# Patient Record
Sex: MALE | Race: BLACK OR AFRICAN AMERICAN | NOT HISPANIC OR LATINO | Employment: UNEMPLOYED | URBAN - METROPOLITAN AREA
[De-identification: names, ages, dates, MRNs, and addresses within clinical notes are randomized per-mention and may not be internally consistent; named-entity substitution may affect disease eponyms.]

---
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
## 2019-02-17 ENCOUNTER — OFFICE VISIT (OUTPATIENT)
Dept: URGENT CARE | Facility: CLINIC | Age: 19
End: 2019-02-17
Payer: MEDICAID

## 2019-02-17 VITALS
TEMPERATURE: 100 F | HEIGHT: 67 IN | RESPIRATION RATE: 18 BRPM | DIASTOLIC BLOOD PRESSURE: 79 MMHG | HEART RATE: 98 BPM | WEIGHT: 205 LBS | BODY MASS INDEX: 32.18 KG/M2 | SYSTOLIC BLOOD PRESSURE: 142 MMHG | OXYGEN SATURATION: 96 %

## 2019-02-17 DIAGNOSIS — R68.89 FLU-LIKE SYMPTOMS: Primary | ICD-10-CM

## 2019-02-17 DIAGNOSIS — J06.9 URI, ACUTE: ICD-10-CM

## 2019-02-17 LAB
CTP QC/QA: YES
FLUAV AG NPH QL: NEGATIVE
FLUBV AG NPH QL: NEGATIVE

## 2019-02-17 PROCEDURE — 87804 POCT INFLUENZA A/B: ICD-10-PCS | Mod: QW,S$GLB,, | Performed by: FAMILY MEDICINE

## 2019-02-17 PROCEDURE — 99213 OFFICE O/P EST LOW 20 MIN: CPT | Mod: S$GLB,,, | Performed by: FAMILY MEDICINE

## 2019-02-17 PROCEDURE — 87804 INFLUENZA ASSAY W/OPTIC: CPT | Mod: QW,S$GLB,, | Performed by: FAMILY MEDICINE

## 2019-02-17 PROCEDURE — 99213 PR OFFICE/OUTPT VISIT, EST, LEVL III, 20-29 MIN: ICD-10-PCS | Mod: S$GLB,,, | Performed by: FAMILY MEDICINE

## 2019-02-17 RX ORDER — DIVALPROEX SODIUM 250 MG/1
TABLET, DELAYED RELEASE ORAL
Refills: 0 | COMMUNITY
Start: 2019-01-22 | End: 2020-10-08

## 2019-02-17 RX ORDER — DIVALPROEX SODIUM 500 MG/1
TABLET, DELAYED RELEASE ORAL
Refills: 0 | COMMUNITY
Start: 2019-01-22 | End: 2020-10-08

## 2019-02-17 RX ORDER — LORATADINE 10 MG/1
10 TABLET ORAL DAILY
Qty: 30 TABLET | Refills: 2 | Status: SHIPPED | OUTPATIENT
Start: 2019-02-17 | End: 2020-02-17

## 2019-02-17 RX ORDER — OSELTAMIVIR PHOSPHATE 75 MG/1
75 CAPSULE ORAL 2 TIMES DAILY
Qty: 10 CAPSULE | Refills: 0 | Status: SHIPPED | OUTPATIENT
Start: 2019-02-17 | End: 2019-02-22

## 2019-02-17 RX ORDER — PALIPERIDONE 3 MG/1
3 TABLET, EXTENDED RELEASE ORAL NIGHTLY
Refills: 0 | COMMUNITY
Start: 2019-01-24 | End: 2020-10-08

## 2019-02-17 RX ORDER — DEXTROAMPHETAMINE SACCHARATE, AMPHETAMINE ASPARTATE, DEXTROAMPHETAMINE SULFATE AND AMPHETAMINE SULFATE 5; 5; 5; 5 MG/1; MG/1; MG/1; MG/1
1 TABLET ORAL EVERY MORNING
Refills: 0 | COMMUNITY
Start: 2019-01-22 | End: 2020-10-08

## 2019-02-17 RX ORDER — OLANZAPINE 10 MG/1
TABLET ORAL
Refills: 0 | COMMUNITY
Start: 2018-12-10 | End: 2020-10-08

## 2019-02-17 NOTE — PROGRESS NOTES
"Subjective:       Patient ID: Vadim Moyer is a 18 y.o. male.    Vitals:  height is 5' 7" (1.702 m) and weight is 93 kg (205 lb). His temperature is 99.5 °F (37.5 °C). His blood pressure is 142/79 (abnormal) and his pulse is 98. His respiration is 18 and oxygen saturation is 96%.     Chief Complaint: Cough    C/o cough and congestion      Cough   This is a new problem. The current episode started yesterday. The problem has been unchanged. The problem occurs constantly. The cough is non-productive. Associated symptoms include a fever. Pertinent negatives include no chills, ear pain, eye redness, hemoptysis, myalgias, rash, sore throat, shortness of breath or wheezing. Nothing aggravates the symptoms. He has tried nothing for the symptoms. His past medical history is significant for asthma. There is no history of bronchitis or pneumonia.       Constitution: Positive for fatigue and fever. Negative for chills and sweating.   HENT: Negative for ear pain, congestion, sinus pain, sinus pressure, sore throat and voice change.    Neck: Negative for painful lymph nodes.   Eyes: Negative for eye redness.   Respiratory: Positive for cough. Negative for chest tightness, sputum production, bloody sputum, COPD, shortness of breath, stridor, wheezing and asthma.    Gastrointestinal: Positive for diarrhea. Negative for nausea and vomiting.   Musculoskeletal: Negative for muscle ache.   Skin: Negative for rash.   Allergic/Immunologic: Negative for seasonal allergies and asthma.   Hematologic/Lymphatic: Negative for swollen lymph nodes.       Objective:      Physical Exam   Constitutional: He is oriented to person, place, and time. He appears well-developed and well-nourished. He is cooperative.  Non-toxic appearance. He does not appear ill.   HENT:   Head: Normocephalic and atraumatic.   Right Ear: Hearing, tympanic membrane, external ear and ear canal normal.   Left Ear: Hearing, tympanic membrane, external ear and ear canal " normal.   Nose: Nose normal. No mucosal edema, rhinorrhea or nasal deformity. No epistaxis. Right sinus exhibits no maxillary sinus tenderness and no frontal sinus tenderness. Left sinus exhibits no maxillary sinus tenderness and no frontal sinus tenderness.   Mouth/Throat: Uvula is midline, oropharynx is clear and moist and mucous membranes are normal. No trismus in the jaw. Normal dentition. No uvula swelling. No oropharyngeal exudate or posterior oropharyngeal erythema.   Eyes: Conjunctivae and lids are normal. Right eye exhibits no discharge. Left eye exhibits no discharge.   Sclera clear bilat   Neck: Trachea normal, normal range of motion, full passive range of motion without pain and phonation normal. Neck supple. No JVD present. No tracheal deviation present.   Cardiovascular: Normal rate, regular rhythm, normal heart sounds, intact distal pulses and normal pulses. Exam reveals no gallop and no friction rub.   No murmur heard.  Pulmonary/Chest: Effort normal and breath sounds normal. No stridor. He has no wheezes. He has no rales.   Abdominal: Soft. Normal appearance and bowel sounds are normal. He exhibits no distension, no pulsatile midline mass and no mass. There is no tenderness.   Musculoskeletal: Normal range of motion. He exhibits no edema or deformity.   Lymphadenopathy:     He has no cervical adenopathy.   Neurological: He is alert and oriented to person, place, and time. He exhibits normal muscle tone. Coordination normal.   Skin: Skin is warm, dry and intact. He is not diaphoretic. No pallor.   Psychiatric: He has a normal mood and affect. His speech is normal and behavior is normal. Judgment and thought content normal. Cognition and memory are normal.   Nursing note and vitals reviewed.      Assessment:       1. Flu-like symptoms    2. URI, acute        Plan:         Flu-like symptoms  -     POCT Influenza A/B  -     loratadine (CLARITIN) 10 mg tablet; Take 1 tablet (10 mg total) by mouth once  daily.  Dispense: 30 tablet; Refill: 2    URI, acute  -     oseltamivir (TAMIFLU) 75 MG capsule; Take 1 capsule (75 mg total) by mouth 2 (two) times daily. for 5 days  Dispense: 10 capsule; Refill: 0        Tylenol alternate with motrin 2 po q 6 hours prn

## 2019-02-17 NOTE — LETTER
February 17, 2019      [facility] [place] [address]  Phone: [phone]  Fax: [phone]       Patient: Vadim Moyer   YOB: 2000  Date of Visit: 02/17/2019    To Whom It May Concern:    Frankie Moyer  was at [facility] on 02/17/2019. He may return to work/school on 2/20/19 with no restrictions. If you have any questions or concerns, or if I can be of further assistance, please do not hesitate to contact me.    Sincerely,    Delphine Torres MD

## 2019-03-27 ENCOUNTER — HOSPITAL ENCOUNTER (EMERGENCY)
Facility: HOSPITAL | Age: 19
Discharge: PSYCHIATRIC OR SUBSTANCE USE FACILITY | End: 2019-03-28
Attending: EMERGENCY MEDICINE
Payer: MEDICAID

## 2019-03-27 DIAGNOSIS — R45.1 AGITATION: ICD-10-CM

## 2019-03-27 DIAGNOSIS — R46.89 AGGRESSIVE BEHAVIOR: ICD-10-CM

## 2019-03-27 DIAGNOSIS — R45.6 VIOLENT BEHAVIOR: ICD-10-CM

## 2019-03-27 DIAGNOSIS — R45.850 HOMICIDAL IDEATIONS: Primary | ICD-10-CM

## 2019-03-27 DIAGNOSIS — Z00.8 MEDICAL CLEARANCE FOR PSYCHIATRIC ADMISSION: ICD-10-CM

## 2019-03-27 PROCEDURE — 99284 EMERGENCY DEPT VISIT MOD MDM: CPT

## 2019-03-28 VITALS
RESPIRATION RATE: 17 BRPM | WEIGHT: 201 LBS | HEART RATE: 77 BPM | SYSTOLIC BLOOD PRESSURE: 128 MMHG | BODY MASS INDEX: 30.46 KG/M2 | OXYGEN SATURATION: 97 % | HEIGHT: 68 IN | DIASTOLIC BLOOD PRESSURE: 80 MMHG | TEMPERATURE: 98 F

## 2019-03-28 LAB
ALBUMIN SERPL BCP-MCNC: 3.7 G/DL (ref 3.5–5.2)
ALP SERPL-CCNC: 148 U/L (ref 55–135)
ALT SERPL W/O P-5'-P-CCNC: 8 U/L (ref 10–44)
AMPHET+METHAMPHET UR QL: NORMAL
ANION GAP SERPL CALC-SCNC: 8 MMOL/L (ref 8–16)
APAP SERPL-MCNC: <3 UG/ML (ref 10–20)
AST SERPL-CCNC: 15 U/L (ref 10–40)
BARBITURATES UR QL SCN>200 NG/ML: NEGATIVE
BASOPHILS # BLD AUTO: 0.02 K/UL (ref 0–0.2)
BASOPHILS NFR BLD: 0.2 % (ref 0–1.9)
BENZODIAZ UR QL SCN>200 NG/ML: NEGATIVE
BILIRUB SERPL-MCNC: 0.3 MG/DL (ref 0.1–1)
BILIRUB UR QL STRIP: NEGATIVE
BUN SERPL-MCNC: 8 MG/DL (ref 6–20)
BZE UR QL SCN: NEGATIVE
CALCIUM SERPL-MCNC: 10.2 MG/DL (ref 8.7–10.5)
CANNABINOIDS UR QL SCN: NORMAL
CHLORIDE SERPL-SCNC: 104 MMOL/L (ref 95–110)
CLARITY UR: CLEAR
CO2 SERPL-SCNC: 30 MMOL/L (ref 23–29)
COLOR UR: YELLOW
CREAT SERPL-MCNC: 0.8 MG/DL (ref 0.5–1.4)
CREAT UR-MCNC: 248.8 MG/DL (ref 23–375)
DIFFERENTIAL METHOD: NORMAL
EOSINOPHIL # BLD AUTO: 0.2 K/UL (ref 0–0.5)
EOSINOPHIL NFR BLD: 1.5 % (ref 0–8)
ERYTHROCYTE [DISTWIDTH] IN BLOOD BY AUTOMATED COUNT: 13.9 % (ref 11.5–14.5)
EST. GFR  (AFRICAN AMERICAN): >60 ML/MIN/1.73 M^2
EST. GFR  (NON AFRICAN AMERICAN): >60 ML/MIN/1.73 M^2
ETHANOL SERPL-MCNC: <10 MG/DL
GLUCOSE SERPL-MCNC: 89 MG/DL (ref 70–110)
GLUCOSE UR QL STRIP: NEGATIVE
HCT VFR BLD AUTO: 45.9 % (ref 40–54)
HGB BLD-MCNC: 14.9 G/DL (ref 14–18)
HGB UR QL STRIP: NEGATIVE
KETONES UR QL STRIP: NEGATIVE
LEUKOCYTE ESTERASE UR QL STRIP: NEGATIVE
LYMPHOCYTES # BLD AUTO: 3.4 K/UL (ref 1–4.8)
LYMPHOCYTES NFR BLD: 30.9 % (ref 18–48)
MCH RBC QN AUTO: 29.4 PG (ref 27–31)
MCHC RBC AUTO-ENTMCNC: 32.5 G/DL (ref 32–36)
MCV RBC AUTO: 91 FL (ref 82–98)
METHADONE UR QL SCN>300 NG/ML: NEGATIVE
MONOCYTES # BLD AUTO: 0.8 K/UL (ref 0.3–1)
MONOCYTES NFR BLD: 7.6 % (ref 4–15)
NEUTROPHILS # BLD AUTO: 6.5 K/UL (ref 1.8–7.7)
NEUTROPHILS NFR BLD: 59.5 % (ref 38–73)
NITRITE UR QL STRIP: NEGATIVE
OPIATES UR QL SCN: NEGATIVE
PCP UR QL SCN>25 NG/ML: NEGATIVE
PH UR STRIP: 7 [PH] (ref 5–8)
PLATELET # BLD AUTO: 244 K/UL (ref 150–350)
PMV BLD AUTO: 9.3 FL (ref 9.2–12.9)
POTASSIUM SERPL-SCNC: 4 MMOL/L (ref 3.5–5.1)
PROT SERPL-MCNC: 6.9 G/DL (ref 6–8.4)
PROT UR QL STRIP: ABNORMAL
RBC # BLD AUTO: 5.07 M/UL (ref 4.6–6.2)
SODIUM SERPL-SCNC: 142 MMOL/L (ref 136–145)
SP GR UR STRIP: 1.02 (ref 1–1.03)
TOXICOLOGY INFORMATION: NORMAL
TSH SERPL DL<=0.005 MIU/L-ACNC: 3.75 UIU/ML (ref 0.4–4)
URN SPEC COLLECT METH UR: ABNORMAL
UROBILINOGEN UR STRIP-ACNC: NEGATIVE EU/DL
VALPROATE SERPL-MCNC: 61.2 UG/ML (ref 50–100)
WBC # BLD AUTO: 10.99 K/UL (ref 3.9–12.7)

## 2019-03-28 PROCEDURE — 80320 DRUG SCREEN QUANTALCOHOLS: CPT

## 2019-03-28 PROCEDURE — 80329 ANALGESICS NON-OPIOID 1 OR 2: CPT

## 2019-03-28 PROCEDURE — 93010 ELECTROCARDIOGRAM REPORT: CPT | Mod: ,,, | Performed by: STUDENT IN AN ORGANIZED HEALTH CARE EDUCATION/TRAINING PROGRAM

## 2019-03-28 PROCEDURE — 81003 URINALYSIS AUTO W/O SCOPE: CPT | Mod: 59

## 2019-03-28 PROCEDURE — 80053 COMPREHEN METABOLIC PANEL: CPT

## 2019-03-28 PROCEDURE — 80164 ASSAY DIPROPYLACETIC ACD TOT: CPT

## 2019-03-28 PROCEDURE — 85025 COMPLETE CBC W/AUTO DIFF WBC: CPT

## 2019-03-28 PROCEDURE — 93005 ELECTROCARDIOGRAM TRACING: CPT

## 2019-03-28 PROCEDURE — 93010 EKG 12-LEAD: ICD-10-PCS | Mod: ,,, | Performed by: STUDENT IN AN ORGANIZED HEALTH CARE EDUCATION/TRAINING PROGRAM

## 2019-03-28 PROCEDURE — 84443 ASSAY THYROID STIM HORMONE: CPT

## 2019-03-28 PROCEDURE — 80307 DRUG TEST PRSMV CHEM ANLYZR: CPT
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
## 2019-03-28 NOTE — ED NOTES
Patient accepted by [place] at [facility] ([address] [place] [address]) for the service of Dr. Ayala.  Report to be called to [phone], ext. [phone].

## 2019-03-28 NOTE — ED NOTES
CON received in [facility].  Admit packet faxed to [facility], [facility], [facility], and [facility].

## 2019-03-28 NOTE — ED NOTES
Pt has been medically cleared. Transfer line called.Spoke to Giulia. Faxed face sheet and PEC to [facility].

## 2019-03-28 NOTE — ED NOTES
Pt is PEC. Placed in paper scrubs. All items that can be used to harm patient or staff removed from room. Bety Romero PCT at bedside for One on one observation. Awaiting lab draw, urine specimen, and CT. Will monitor pt closely.
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
## 2019-03-28 NOTE — ED NOTES
Admit packet faxed to [facility], [facility], [facility], [facility], [facility], [facility], [facility], [place],  Waiting for response.

## 2019-05-29 ENCOUNTER — HOSPITAL ENCOUNTER (EMERGENCY)
Facility: HOSPITAL | Age: 19
Discharge: HOME | End: 2019-05-29
Attending: EMERGENCY MEDICINE
Payer: MEDICAID

## 2019-05-29 VITALS
OXYGEN SATURATION: 100 % | BODY MASS INDEX: 30.53 KG/M2 | WEIGHT: 190 LBS | HEART RATE: 82 BPM | SYSTOLIC BLOOD PRESSURE: 140 MMHG | TEMPERATURE: 99 F | DIASTOLIC BLOOD PRESSURE: 75 MMHG | RESPIRATION RATE: 18 BRPM | HEIGHT: 66 IN

## 2019-05-29 DIAGNOSIS — T67.5XXA HEAT EXHAUSTION, INITIAL ENCOUNTER: Primary | ICD-10-CM

## 2019-05-29 PROCEDURE — 99283 EMERGENCY DEPT VISIT LOW MDM: CPT | Mod: ER

## 2019-05-29 PROCEDURE — 25000003 PHARM REV CODE 250: Mod: ER | Performed by: EMERGENCY MEDICINE

## 2019-05-29 RX ORDER — IBUPROFEN 400 MG/1
400 TABLET ORAL
Status: COMPLETED | OUTPATIENT
Start: 2019-05-29 | End: 2019-05-29

## 2019-05-29 RX ORDER — ACETAMINOPHEN 325 MG/1
650 TABLET ORAL
Status: COMPLETED | OUTPATIENT
Start: 2019-05-29 | End: 2019-05-29

## 2019-05-29 RX ADMIN — IBUPROFEN 400 MG: 400 TABLET, FILM COATED ORAL at 09:05

## 2019-05-29 RX ADMIN — ACETAMINOPHEN 650 MG: 325 TABLET, FILM COATED ORAL at 09:05

## 2019-05-30 NOTE — ED PROVIDER NOTES
Encounter Date: 5/29/2019    SCRIBE #1 NOTE: I, Luis Oquendo, am scribing for, and in the presence of,  Dr. Cole. I have scribed the following portions of the note - Other sections scribed: HPI, ROS, PE.       History     Chief Complaint   Patient presents with    Headache     PT C/O GENERALIZED HEADACHE FOR 15 MIN     CC:   Generalized HA  HPI:  This is a 18 y.o. male who presents to the ED with a chief complaint of an acute nontraumatic generalized HA that began fifteen minutes ago.  Pt endorses lightheadedness.  Pt states that he works at [employer] and was in a hot environment.  Pt admits he has not drank much water today.  He has not taken pain medication.  Nothing worsens his symptoms.  He denies fever, sore throat, visual disturbances, LOC, nausea, emesis, or diarrhea.     The history is provided by the patient.     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     Asthma, not well controlled     Asthma, well controlled     Atopy     Bipolar 1 disorder     Hypertension     Overweight(278.02)     Psychiatric symptoms      Past Surgical History:   Procedure Laterality Date    PENIS SURGERY       Family History   Problem Relation Age of Onset    Asthma Mother     Asthma Sister     Hypertension Maternal Grandmother     Coronary artery disease Maternal Grandmother     Heart attack Maternal Grandfather      Social History     Tobacco Use    Smoking status: Current Every Day Smoker     Packs/day: 0.50     Types: Cigarettes    Smokeless tobacco: Never Used   Substance Use Topics    Alcohol use: No    Drug use: No     Review of Systems   Constitutional: Negative.  Negative for chills and fever.   HENT: Negative.  Negative for sore throat.    Eyes: Negative.  Negative for pain and visual disturbance.   Respiratory: Negative.  Negative for shortness of breath.     Cardiovascular: Negative.  Negative for chest pain.   Gastrointestinal: Negative.  Negative for abdominal pain, diarrhea, nausea and vomiting.   Genitourinary: Negative.  Negative for dysuria.   Musculoskeletal: Negative.  Negative for back pain.   Skin: Negative.  Negative for rash.   Neurological: Positive for light-headedness and headaches. Negative for syncope.   Hematological: Negative.    Psychiatric/Behavioral: Negative.    All other systems reviewed and are negative.      Physical Exam     Initial Vitals [05/29/19 2055]   BP Pulse Resp Temp SpO2   (!) 145/81 80 20 98.8 °F (37.1 °C) 100 %      MAP       --         Physical Exam    Nursing note and vitals reviewed.  Constitutional: He appears well-developed and well-nourished.   HENT:   Head: Normocephalic and atraumatic.   Mouth/Throat: Uvula is midline. Mucous membranes are dry.   Dry, pasty mucous membranes.    Eyes: Conjunctivae and EOM are normal. Pupils are equal, round, and reactive to light.   Neck: Normal range of motion. Neck supple.   Cardiovascular: Regular rhythm and intact distal pulses. Tachycardia present.  Exam reveals no gallop and no friction rub.    No murmur heard.  Pt became tachycardic when he sat up.     Pulmonary/Chest: Effort normal and breath sounds normal. No respiratory distress. He has no wheezes. He has no rhonchi. He has no rales. He exhibits no tenderness.   Abdominal: Soft. There is no tenderness.   Musculoskeletal: Normal range of motion.   Neurological: He is alert and oriented to person, place, and time.   Skin: Skin is warm and dry. Capillary refill takes less than 2 seconds.        Psychiatric: He has a normal mood and affect. His behavior is normal.         ED Course   Procedures  Labs Reviewed - No data to display       Imaging Results    None                     Scribe Attestation:   Scribe #1: I performed the above scribed service and the documentation accurately describes the services I performed. I attest to the  accuracy of the note.    This document was produced by a scribe under my direction and in my presence. I agree with the content of the note and have made any necessary edits.     Driss Cole MD    05/29/2019 9:17 PM           Clinical Impression:     1. Heat exhaustion, initial encounter                                  Driss Cole MD  05/29/19 0005

## 2019-05-30 NOTE — DISCHARGE INSTRUCTIONS
Tylenol Motrin and plenty of fluids.  Drink a balance electrolyte solution such as Gatorade.  No work tomorrow and avoid any excessive heat.

## 2019-10-02 ENCOUNTER — OFFICE VISIT (OUTPATIENT)
Dept: URGENT CARE | Facility: CLINIC | Age: 19
End: 2019-10-02
Payer: MEDICAID

## 2019-10-02 VITALS
SYSTOLIC BLOOD PRESSURE: 103 MMHG | OXYGEN SATURATION: 97 % | TEMPERATURE: 98 F | BODY MASS INDEX: 30.53 KG/M2 | HEIGHT: 66 IN | RESPIRATION RATE: 18 BRPM | WEIGHT: 190 LBS | DIASTOLIC BLOOD PRESSURE: 76 MMHG | HEART RATE: 69 BPM

## 2019-10-02 DIAGNOSIS — J45.901 MODERATE ASTHMA WITH EXACERBATION, UNSPECIFIED WHETHER PERSISTENT: Primary | ICD-10-CM

## 2019-10-02 PROCEDURE — 94640 PR INHAL RX, AIRWAY OBST/DX SPUTUM INDUCT: ICD-10-PCS | Mod: S$GLB,,, | Performed by: PHYSICIAN ASSISTANT

## 2019-10-02 PROCEDURE — 99214 OFFICE O/P EST MOD 30 MIN: CPT | Mod: 25,S$GLB,, | Performed by: PHYSICIAN ASSISTANT

## 2019-10-02 PROCEDURE — 94640 AIRWAY INHALATION TREATMENT: CPT | Mod: S$GLB,,, | Performed by: PHYSICIAN ASSISTANT

## 2019-10-02 PROCEDURE — 99214 PR OFFICE/OUTPT VISIT, EST, LEVL IV, 30-39 MIN: ICD-10-PCS | Mod: 25,S$GLB,, | Performed by: PHYSICIAN ASSISTANT

## 2019-10-02 RX ORDER — BUDESONIDE AND FORMOTEROL FUMARATE DIHYDRATE 80; 4.5 UG/1; UG/1
2 AEROSOL RESPIRATORY (INHALATION) 2 TIMES DAILY
Refills: 1 | COMMUNITY
Start: 2019-08-20 | End: 2020-10-08 | Stop reason: SDUPTHER

## 2019-10-02 RX ORDER — PREDNISONE 20 MG/1
TABLET ORAL
Qty: 9 TABLET | Refills: 0 | Status: SHIPPED | OUTPATIENT
Start: 2019-10-02 | End: 2020-10-08

## 2019-10-02 RX ORDER — ALBUTEROL SULFATE 90 UG/1
2 AEROSOL, METERED RESPIRATORY (INHALATION) EVERY 6 HOURS PRN
Qty: 18 G | Refills: 0 | Status: SHIPPED | OUTPATIENT
Start: 2019-10-02 | End: 2019-11-01

## 2019-10-02 RX ORDER — MIRTAZAPINE 30 MG/1
30 TABLET, FILM COATED ORAL NIGHTLY
Refills: 0 | COMMUNITY
Start: 2019-07-25 | End: 2020-10-08

## 2019-10-02 RX ORDER — ALBUTEROL SULFATE 0.83 MG/ML
2.5 SOLUTION RESPIRATORY (INHALATION)
Status: COMPLETED | OUTPATIENT
Start: 2019-10-02 | End: 2019-10-02

## 2019-10-02 RX ORDER — ALBUTEROL SULFATE 1.25 MG/3ML
2.5 SOLUTION RESPIRATORY (INHALATION) EVERY 6 HOURS PRN
Qty: 1 BOX | Refills: 0 | Status: SHIPPED | OUTPATIENT
Start: 2019-10-02 | End: 2019-11-01

## 2019-10-02 RX ADMIN — ALBUTEROL SULFATE 2.5 MG: 0.83 SOLUTION RESPIRATORY (INHALATION) at 01:10

## 2019-10-02 NOTE — PATIENT INSTRUCTIONS
PLEASE READ YOUR DISCHARGE INSTRUCTIONS ENTIRELY AS IT CONTAINS IMPORTANT INFORMATION.  Please start the prednisone tomorrow.  Please follow up with your PCP.  - Rest.    - Drink plenty of fluids.    - Tylenol or Ibuprofen as directed as needed for fever/pain.    - Follow up with your PCP or specialty clinic as directed in the next 1-2 weeks if not improved or as needed.  You can call [phone] to schedule an appointment with the appropriate provider.    - If you were prescribed antibiotics, please take them to completion.  - If you were prescribed a narcotic medication, do not drive or operate heavy equipment or machinery while taking these medications.  - If you  smoke, please stop smoking.  -You must understand that you've received an Urgent Care treatment only and that you may be released before all your medical problems are known or treated. You, the patient, will    arrange for follow up care as instructed.  - Please return to Urgent Care or to the Emergency Department if your symptoms worsen.  You received a steroid today - this can elevate your blood pressure, elevate your blood sugar, water weight gain, nervous energy, redness to the face and dimpling of the skin where the shot goes in if you had an injection.   Do not use steroids more than 3 times per year.   If you have diabetes, please check you blood sugar frequently.  If you have high blood pressure, please check your blood pressure frequently.     Patient aware and verbalized understanding.    Asthma Trigger Checklist  Allergens, irritants, and other things may trigger your asthma. Check the box next to each of your triggers. After each trigger is a list of ways to avoid it.     Dust mites. Dust mites live in mattresses, bedding, carpets, curtains, and indoor dust.  · To kill dust mites, wash bedding in hot water (130°F) each week.  · Cover mattress and pillows with special dust-mite-proof cases.  · Dont use upholstered furniture like sofas or  chairs in the bedroom.  · Use allergy-proof filters for air conditioners and furnaces. Replace or clean them as instructed.  · If you can, replace carpeting with wood or tile misti, especially in the bedroom.    Animals. Animals with fur or feathers shed dander (allergens).  · Its best to choose a pet that doesnt have fur or feathers, such as a fish or a reptile.  · If you have pets, keep them off of your bed and out of your bedroom.  · Wash your hands and clothes after handling pets.      Mold. Mold grows in damp places, such as bathrooms, basements, and closets.  · Ask someone to clean damp areas in your home every week. Or try wearing a face mask while you clean.  · Run an exhaust fan while bathing. Or leave a window open in the bathroom.  · Repair water leaks in or around your home.  · Have someone else cut grass or rake leaves, if possible.  · Dont use vaporizers or humidifiers. They encourage mold growth.      Pollen. Pollen from trees, grasses, and weeds is a common allergen. (Flower pollens are generally not a problem).  · Try to learn what types of pollen affect you most. Pollen levels vary depending on the plant, the season, and the time of day.  · If possible, use air conditioning instead of opening the windows in your home or car.  · Have someone else do yard work, if possible.      Cockroaches. Roaches are found in many homes and produce allergens.  · Keep your kitchen clean and dry. A leaky faucet or drain can attract roaches.  · Remove garbage from your home daily.  · Store food in tightly sealed containers. Wash dishes as soon as they are used.  · Use bait stations or traps to control roaches. Avoid using chemical sprays.      Smoke. Smoke may be from cigarettes, cigars, pipes, incense or candles, barbecues or grills, and fireplaces.  · Dont smoke. And dont let people smoke in your home or car.  · When you travel, ask for nonsmoking rental cars and hotel rooms.  · Avoid fireplaces and wood  stoves. If you cant, sit away from them. Make sure the smoke is directed outside.  · Dont burn incense or use candles.  · Move away from smoky outdoor cooking grills.      Smog.  Smog is from car exhaust and other pollution.  · Read or listen to local air-quality reports. These let you know when air quality is poor.  · Stay indoors as much as you can on smoggy days. If possible, use air conditioning instead of opening the windows.  · In your car, set air conditioning to recirculate air, so less pollution gets in.      Strong odors. These include air fresheners, deodorizers, and cleaning products; perfume, deodorant, and other beauty products; incense and candles; and insect sprays and other sprays.  · Use scent-free products like deodorant or body lotion.  · Avoid using cleaning products with bleach and ammonia. Make your own cleaning solution with white vinegar, baking soda, or mild dish soap.  · Use exhaust fans while cooking. Or open a window, if possible.   · Avoid perfumes, air fresheners, potpourri, and other scented products.      Other irritants. These include dust, aerosol sprays, and powders.  · Wear a face mask while doing tasks like sanding, dusting, sweeping, and yard work. Open doors and windows if working indoors.  · Use pump spray bottles instead of aerosols.  · Pour liquid  onto a rag or cloth instead of spraying them.      Weather. Weather conditions can trigger symptoms or make them worse.  · Watch for very high or low temperatures, very humid conditions, or a lot of wind, as these conditions can make symptoms worse.  · Limit outdoor activity during the type of weather that affects you.  · Wear a scarf over your mouth and nose in cold weather.      Colds, flu, and sinus infections. Upper respiratory infections can trigger asthma.  · Wash your hands often with soap and warm water or use a hand  containing alcohol.  · Get a yearly flu shot. And ask if you should get a pneumonia  vaccine.  · Take care of your general health. Get plenty of sleep. And eat a variety of healthy foods.      Food additives. Food additives can trigger asthma flare-ups in some people.  · Check food labels for sulfites or other similar ingredients. These are often found in foods such as wine, beer, and dried fruits.  · Avoid foods that contain these additives.      Medicine. Aspirin, NSAIDS like ibuprofen and naproxen, and heart medicines like beta-blockers may be triggers.  · Tell your health care provider if you think certain medicines trigger symptoms.   · Be sure to read the labels on over-the-counter medicines. They may have ingredients that cause symptoms for you.     , Emotions. Laughing, crying, or feeling excited are triggers for some people.   · To help you stay calm: Try breathing in slowly through your nose for a count of 2 seconds. Close your lips and breathe out for 4 seconds. Repeat.  · Try to focus on a soothing image in your mind. This will help relax you and calm your breathing.  · Remember to take your daily controller medicines. When youre upset or under stress, its easy to forget.      Exercise. For some people, exercise can trigger symptoms. Dont let this keep you from being active.   · If you have not been exercising regularly, start slow and work up gradually.  · Take all of your medicines as prescribed.  · If you use quick-relief medicine, make sure you have it with you when you exercise.  · Stop if you have any symptoms. Make sure you talk with your provider about these symptoms.  Date Last Reviewed: 1/1/2017  © 6289-6662 [employer]. [address] [place] [address], [place] [address]. All rights reserved. This information is not intended as a substitute for professional medical care. Always follow your healthcare professional's instructions.        Asthma (Adult)  Asthma is a disease where the medium and  small air passages within the lung go into spasm and restrict the flow of air.  "Inflammation and swelling of the airways cause further restriction. During an acute asthma attack, these factors cause difficulty breathing, wheezing, cough and chest tightness.    An asthma attack can be triggered by many things. Common triggers include infections such as the common cold, bronchitis, pneumonia. Irritants such as smoke or pollutants in the air, emotional upset, and exercise can also trigger an attack. In many adults with asthma, allergies to dust, mold, pollen and animal dander can cause an asthma attack. Skipping doses of daily asthma medicine can also bring on an asthma attack.  Asthma can be controlled using the proper medicines prescribed by your healthcare provider and avoiding exposure to known triggers including allergens and irritants.  Home care  · Take prescribed medicine exactly at the times advised. If you need medicine such as from a hand held inhaler or aerosol breathing machine more than every 4 hours, contact your healthcare provider or seek immediate medical attention. If prescribed an antibiotic or prednisone, take all of the medicine as prescribed, even if you are feeling better after a few days.  · Do not smoke. Avoid being exposed to the smoke of others.  · Some people with asthma have worsening of their symptoms when they take aspirin and non-steroidal or fever-reducing medicines like ibuprofen and naproxen. Talk to your healthcare provider if you think this may apply to you.  Follow-up care  Follow up with your healthcare provider, or as advised. Always bring all of your current medicines to any appointments with your healthcare provider. Also bring a complete list of medications even those not taken for asthma. If you do not already have one, talk to your healthcare provider about developing a personalized "Asthma Action Plan."  A pneumococcal (pneumonia) vaccine and yearly flu shot (every fall) are recommended. Ask your doctor about this.  When to seek medical advice  Call " your healthcare provider right away if any of these occur:   · Increased wheezing or shortness of breath  · Need to use your inhalers more often than usual without relief  · Fever of 100.4ºF (38ºC) or higher, or as directed by your healthcare provider  · Coughing up lots of dark-colored or bloody sputum (mucus)  · Chest pain with each breath  · If you use a peak flow meter as part of an Asthma Action Plan, and you are still in the yellow zone (50% to 80%) 15 minutes after using inhaler medicine.  Call 911  Call 911 if any of the following occur  · Trouble walking or talking because of shortness of breath  · If you use a peak flow meter as part of an Asthma Action Plan and you are still in the red zone (less than 50%) 15 minutes after using inhaler medicine  · Lips or fingernails turning gray or blue  Date Last Reviewed: 12/2/2015  © 9092-3087 [email or web address]. [address] [place] [address], [place] [address]. All rights reserved. This information is not intended as a substitute for professional medical care. Always follow your healthcare professional's instructions.

## 2019-10-02 NOTE — PROGRESS NOTES
"Subjective:       Patient ID: Vadim Moyer is a 18 y.o. male.    Vitals:  height is 5' 6" (1.676 m) and weight is 86.2 kg (190 lb). His temperature is 97.9 °F (36.6 °C). His blood pressure is 103/76 and his pulse is 69. His respiration is 18 and oxygen saturation is 97%.     Chief Complaint: Asthma    Vadim presents with his mother for asthma exacerbation.  He was cutting the gras 3 days ago & started having coughing & wheezing the next day.  It has worsened since then & last night, he was up all night doing nebulizer treatments with little relief.  He denies any congestion, fevers, rhinorrhea, sore throat.  He currently take symbicort on a daily basis.    Asthma   He complains of cough, sputum production and wheezing. There is no hemoptysis or shortness of breath. This is a new problem. The current episode started yesterday. The problem has been gradually worsening. The cough is productive of sputum. Pertinent negatives include no ear pain, fever, myalgias or sore throat. His symptoms are aggravated by exposure to smoke, pollen and any activity. His symptoms are alleviated by ipratropium (albuterol). He reports moderate improvement on treatment. His past medical history is significant for asthma.       Constitution: Negative for chills, sweating, fatigue and fever.   HENT: Negative for ear pain, congestion, sinus pain, sinus pressure, sore throat and voice change.    Neck: Negative for painful lymph nodes.   Eyes: Negative for eye redness.   Respiratory: Positive for cough, sputum production, wheezing and asthma. Negative for chest tightness, bloody sputum, COPD, shortness of breath and stridor.    Gastrointestinal: Negative for nausea and vomiting.   Musculoskeletal: Negative for muscle ache.   Skin: Negative for rash.   Allergic/Immunologic: Positive for asthma. Negative for seasonal allergies.   Hematologic/Lymphatic: Negative for swollen lymph nodes.       Objective:      Physical Exam   Constitutional: He " is oriented to person, place, and time. He appears well-developed and well-nourished. He is cooperative.  Non-toxic appearance. He does not appear ill. No distress.   HENT:   Head: Normocephalic and atraumatic.   Right Ear: Hearing, tympanic membrane, external ear and ear canal normal.   Left Ear: Hearing, tympanic membrane, external ear and ear canal normal.   Nose: Nose normal. No mucosal edema, rhinorrhea or nasal deformity. No epistaxis. Right sinus exhibits no maxillary sinus tenderness and no frontal sinus tenderness. Left sinus exhibits no maxillary sinus tenderness and no frontal sinus tenderness.   Mouth/Throat: Uvula is midline, oropharynx is clear and moist and mucous membranes are normal. No trismus in the jaw. Normal dentition. No uvula swelling. No posterior oropharyngeal erythema.   Eyes: Conjunctivae and lids are normal. No scleral icterus.   Sclera clear bilat   Neck: Trachea normal, full passive range of motion without pain and phonation normal. Neck supple.   Cardiovascular: Normal rate, regular rhythm, normal heart sounds, intact distal pulses and normal pulses.   Pulmonary/Chest: Effort normal. No stridor. No respiratory distress. He has no decreased breath sounds. He has wheezes in the right upper field, the right lower field, the left upper field and the left lower field. He has no rhonchi. He has no rales.   Insp & exp wheezes throughout.   Abdominal: Soft. Normal appearance and bowel sounds are normal. He exhibits no distension. There is no tenderness.   Musculoskeletal: Normal range of motion. He exhibits no edema or deformity.   Lymphadenopathy:     He has no cervical adenopathy.   Neurological: He is alert and oriented to person, place, and time. He exhibits normal muscle tone. Coordination normal.   Skin: Skin is warm, dry and intact. He is not diaphoretic. No pallor.   Psychiatric: He has a normal mood and affect. His speech is normal and behavior is normal. Judgment and thought content  normal. Cognition and memory are normal.   Nursing note and vitals reviewed.      O2 sat 100% on repeat exam, pulse 68, improved aeration throughout lung fields, minimal wheeze    Assessment:       1. Moderate asthma with exacerbation, unspecified whether persistent        Plan:         Moderate asthma with exacerbation, unspecified whether persistent    Other orders  -     albuterol nebulizer solution 2.5 mg  -     methylPREDNISolone sod suc(PF) injection 125 mg  -     albuterol (PROVENTIL HFA) 90 mcg/actuation inhaler; Inhale 2 puffs into the lungs every 6 (six) hours as needed for Wheezing. Rescue  Dispense: 18 g; Refill: 0  -     albuterol (ACCUNEB) 1.25 mg/3 mL Nebu; Take 6 mLs (2.5 mg total) by nebulization every 6 (six) hours as needed (wheezing). Rescue  Dispense: 1 Box; Refill: 0  -     predniSONE (DELTASONE) 20 MG tablet; Take 2 tabs by mouth once daily x 3 days, then 1 tab by mouth once daily x 3 days.  Dispense: 9 tablet; Refill: 0    Discussed risks of steroids with patient, he verbalized understanding.     Patient Instructions   PLEASE READ YOUR DISCHARGE INSTRUCTIONS ENTIRELY AS IT CONTAINS IMPORTANT INFORMATION.  Please start the prednisone tomorrow.  Please follow up with your PCP.  - Rest.    - Drink plenty of fluids.    - Tylenol or Ibuprofen as directed as needed for fever/pain.    - Follow up with your PCP or specialty clinic as directed in the next 1-2 weeks if not improved or as needed.  You can call [phone] to schedule an appointment with the appropriate provider.    - If you were prescribed antibiotics, please take them to completion.  - If you were prescribed a narcotic medication, do not drive or operate heavy equipment or machinery while taking these medications.  - If you  smoke, please stop smoking.  -You must understand that you've received an Urgent Care treatment only and that you may be released before all your medical problems are known or treated. You, the patient, will     arrange for follow up care as instructed.  - Please return to Urgent Care or to the Emergency Department if your symptoms worsen.  You received a steroid today - this can elevate your blood pressure, elevate your blood sugar, water weight gain, nervous energy, redness to the face and dimpling of the skin where the shot goes in if you had an injection.   Do not use steroids more than 3 times per year.   If you have diabetes, please check you blood sugar frequently.  If you have high blood pressure, please check your blood pressure frequently.     Patient aware and verbalized understanding.    Asthma Trigger Checklist  Allergens, irritants, and other things may trigger your asthma. Check the box next to each of your triggers. After each trigger is a list of ways to avoid it.     Dust mites. Dust mites live in mattresses, bedding, carpets, curtains, and indoor dust.  · To kill dust mites, wash bedding in hot water (130°F) each week.  · Cover mattress and pillows with special dust-mite-proof cases.  · Dont use upholstered furniture like sofas or chairs in the bedroom.  · Use allergy-proof filters for air conditioners and furnaces. Replace or clean them as instructed.  · If you can, replace carpeting with wood or tile misti, especially in the bedroom.    Animals. Animals with fur or feathers shed dander (allergens).  · Its best to choose a pet that doesnt have fur or feathers, such as a fish or a reptile.  · If you have pets, keep them off of your bed and out of your bedroom.  · Wash your hands and clothes after handling pets.      Mold. Mold grows in damp places, such as bathrooms, basements, and closets.  · Ask someone to clean damp areas in your home every week. Or try wearing a face mask while you clean.  · Run an exhaust fan while bathing. Or leave a window open in the bathroom.  · Repair water leaks in or around your home.  · Have someone else cut grass or rake leaves, if possible.  · Dont use  vaporizers or humidifiers. They encourage mold growth.      Pollen. Pollen from trees, grasses, and weeds is a common allergen. (Flower pollens are generally not a problem).  · Try to learn what types of pollen affect you most. Pollen levels vary depending on the plant, the season, and the time of day.  · If possible, use air conditioning instead of opening the windows in your home or car.  · Have someone else do yard work, if possible.      Cockroaches. Roaches are found in many homes and produce allergens.  · Keep your kitchen clean and dry. A leaky faucet or drain can attract roaches.  · Remove garbage from your home daily.  · Store food in tightly sealed containers. Wash dishes as soon as they are used.  · Use bait stations or traps to control roaches. Avoid using chemical sprays.      Smoke. Smoke may be from cigarettes, cigars, pipes, incense or candles, barbecues or grills, and fireplaces.  · Dont smoke. And dont let people smoke in your home or car.  · When you travel, ask for nonsmoking rental cars and hotel rooms.  · Avoid fireplaces and wood stoves. If you cant, sit away from them. Make sure the smoke is directed outside.  · Dont burn incense or use candles.  · Move away from smoky outdoor cooking grills.      Smog.  Smog is from car exhaust and other pollution.  · Read or listen to local air-quality reports. These let you know when air quality is poor.  · Stay indoors as much as you can on smoggy days. If possible, use air conditioning instead of opening the windows.  · In your car, set air conditioning to recirculate air, so less pollution gets in.      Strong odors. These include air fresheners, deodorizers, and cleaning products; perfume, deodorant, and other beauty products; incense and candles; and insect sprays and other sprays.  · Use scent-free products like deodorant or body lotion.  · Avoid using cleaning products with bleach and ammonia. Make your own cleaning solution with white vinegar,  baking soda, or mild dish soap.  · Use exhaust fans while cooking. Or open a window, if possible.   · Avoid perfumes, air fresheners, potpourri, and other scented products.      Other irritants. These include dust, aerosol sprays, and powders.  · Wear a face mask while doing tasks like sanding, dusting, sweeping, and yard work. Open doors and windows if working indoors.  · Use pump spray bottles instead of aerosols.  · Pour liquid  onto a rag or cloth instead of spraying them.      Weather. Weather conditions can trigger symptoms or make them worse.  · Watch for very high or low temperatures, very humid conditions, or a lot of wind, as these conditions can make symptoms worse.  · Limit outdoor activity during the type of weather that affects you.  · Wear a scarf over your mouth and nose in cold weather.      Colds, flu, and sinus infections. Upper respiratory infections can trigger asthma.  · Wash your hands often with soap and warm water or use a hand  containing alcohol.  · Get a yearly flu shot. And ask if you should get a pneumonia vaccine.  · Take care of your general health. Get plenty of sleep. And eat a variety of healthy foods.      Food additives. Food additives can trigger asthma flare-ups in some people.  · Check food labels for sulfites or other similar ingredients. These are often found in foods such as wine, beer, and dried fruits.  · Avoid foods that contain these additives.      Medicine. Aspirin, NSAIDS like ibuprofen and naproxen, and heart medicines like beta-blockers may be triggers.  · Tell your health care provider if you think certain medicines trigger symptoms.   · Be sure to read the labels on over-the-counter medicines. They may have ingredients that cause symptoms for you.     , Emotions. Laughing, crying, or feeling excited are triggers for some people.   · To help you stay calm: Try breathing in slowly through your nose for a count of 2 seconds. Close your lips and  breathe out for 4 seconds. Repeat.  · Try to focus on a soothing image in your mind. This will help relax you and calm your breathing.  · Remember to take your daily controller medicines. When youre upset or under stress, its easy to forget.      Exercise. For some people, exercise can trigger symptoms. Dont let this keep you from being active.   · If you have not been exercising regularly, start slow and work up gradually.  · Take all of your medicines as prescribed.  · If you use quick-relief medicine, make sure you have it with you when you exercise.  · Stop if you have any symptoms. Make sure you talk with your provider about these symptoms.  Date Last Reviewed: 1/1/2017 © 2000-2017 [employer]. [address] [place] [address], [place] [address]. All rights reserved. This information is not intended as a substitute for professional medical care. Always follow your healthcare professional's instructions.        Asthma (Adult)  Asthma is a disease where the medium and  small air passages within the lung go into spasm and restrict the flow of air. Inflammation and swelling of the airways cause further restriction. During an acute asthma attack, these factors cause difficulty breathing, wheezing, cough and chest tightness.    An asthma attack can be triggered by many things. Common triggers include infections such as the common cold, bronchitis, pneumonia. Irritants such as smoke or pollutants in the air, emotional upset, and exercise can also trigger an attack. In many adults with asthma, allergies to dust, mold, pollen and animal dander can cause an asthma attack. Skipping doses of daily asthma medicine can also bring on an asthma attack.  Asthma can be controlled using the proper medicines prescribed by your healthcare provider and avoiding exposure to known triggers including allergens and irritants.  Home care  · Take prescribed medicine exactly at the times advised. If you need medicine such as from a  "hand held inhaler or aerosol breathing machine more than every 4 hours, contact your healthcare provider or seek immediate medical attention. If prescribed an antibiotic or prednisone, take all of the medicine as prescribed, even if you are feeling better after a few days.  · Do not smoke. Avoid being exposed to the smoke of others.  · Some people with asthma have worsening of their symptoms when they take aspirin and non-steroidal or fever-reducing medicines like ibuprofen and naproxen. Talk to your healthcare provider if you think this may apply to you.  Follow-up care  Follow up with your healthcare provider, or as advised. Always bring all of your current medicines to any appointments with your healthcare provider. Also bring a complete list of medications even those not taken for asthma. If you do not already have one, talk to your healthcare provider about developing a personalized "Asthma Action Plan."  A pneumococcal (pneumonia) vaccine and yearly flu shot (every fall) are recommended. Ask your doctor about this.  When to seek medical advice  Call your healthcare provider right away if any of these occur:   · Increased wheezing or shortness of breath  · Need to use your inhalers more often than usual without relief  · Fever of 100.4ºF (38ºC) or higher, or as directed by your healthcare provider  · Coughing up lots of dark-colored or bloody sputum (mucus)  · Chest pain with each breath  · If you use a peak flow meter as part of an Asthma Action Plan, and you are still in the yellow zone (50% to 80%) 15 minutes after using inhaler medicine.  Call 911  Call 911 if any of the following occur  · Trouble walking or talking because of shortness of breath  · If you use a peak flow meter as part of an Asthma Action Plan and you are still in the red zone (less than 50%) 15 minutes after using inhaler medicine  · Lips or fingernails turning gray or blue  Date Last Reviewed: 12/2/2015  © 8421-8040 [employer]. [address] [place] [id number]. All rights reserved. This information is not intended as a substitute for professional medical care. Always follow your healthcare professional's instructions.

## 2019-10-23 RX ORDER — ALBUTEROL SULFATE 90 UG/1
2 AEROSOL, METERED RESPIRATORY (INHALATION) EVERY 6 HOURS PRN
Qty: 18 INHALER | Refills: 0 | OUTPATIENT
Start: 2019-10-23 | End: 2019-11-22

## 2020-02-28 ENCOUNTER — HOSPITAL ENCOUNTER (EMERGENCY)
Facility: HOSPITAL | Age: 20
Discharge: HOME | End: 2020-02-29
Attending: EMERGENCY MEDICINE
Payer: MEDICAID

## 2020-02-28 DIAGNOSIS — J45.41 MODERATE PERSISTENT ASTHMA WITH EXACERBATION: Primary | ICD-10-CM

## 2020-02-28 PROCEDURE — 99284 EMERGENCY DEPT VISIT MOD MDM: CPT | Mod: 25

## 2020-02-28 PROCEDURE — 25000242 PHARM REV CODE 250 ALT 637 W/ HCPCS: Performed by: EMERGENCY MEDICINE

## 2020-02-28 PROCEDURE — 94640 AIRWAY INHALATION TREATMENT: CPT

## 2020-02-28 RX ORDER — PREDNISONE 20 MG/1
40 TABLET ORAL
Status: COMPLETED | OUTPATIENT
Start: 2020-02-28 | End: 2020-02-29

## 2020-02-28 RX ORDER — IPRATROPIUM BROMIDE AND ALBUTEROL SULFATE 2.5; .5 MG/3ML; MG/3ML
3 SOLUTION RESPIRATORY (INHALATION)
Status: COMPLETED | OUTPATIENT
Start: 2020-02-28 | End: 2020-02-28

## 2020-02-28 RX ADMIN — IPRATROPIUM BROMIDE AND ALBUTEROL SULFATE 3 ML: .5; 3 SOLUTION RESPIRATORY (INHALATION) at 11:02

## 2020-02-29 ENCOUNTER — OFFICE VISIT (OUTPATIENT)
Dept: URGENT CARE | Facility: CLINIC | Age: 20
End: 2020-02-29
Payer: MEDICAID

## 2020-02-29 VITALS
RESPIRATION RATE: 18 BRPM | HEIGHT: 66 IN | HEART RATE: 80 BPM | TEMPERATURE: 98 F | WEIGHT: 185.75 LBS | SYSTOLIC BLOOD PRESSURE: 127 MMHG | DIASTOLIC BLOOD PRESSURE: 89 MMHG | BODY MASS INDEX: 29.85 KG/M2 | OXYGEN SATURATION: 100 %

## 2020-02-29 VITALS
TEMPERATURE: 98 F | WEIGHT: 185 LBS | HEART RATE: 81 BPM | BODY MASS INDEX: 29.73 KG/M2 | HEIGHT: 66 IN | OXYGEN SATURATION: 96 % | DIASTOLIC BLOOD PRESSURE: 90 MMHG | RESPIRATION RATE: 18 BRPM | SYSTOLIC BLOOD PRESSURE: 150 MMHG

## 2020-02-29 DIAGNOSIS — J45.21 MILD INTERMITTENT ASTHMA WITH EXACERBATION: ICD-10-CM

## 2020-02-29 DIAGNOSIS — H65.01 NON-RECURRENT ACUTE SEROUS OTITIS MEDIA OF RIGHT EAR: ICD-10-CM

## 2020-02-29 DIAGNOSIS — J06.9 UPPER RESPIRATORY VIRUS: Primary | ICD-10-CM

## 2020-02-29 LAB
INFLUENZA A, MOLECULAR: NEGATIVE
INFLUENZA B, MOLECULAR: NEGATIVE
SPECIMEN SOURCE: NORMAL

## 2020-02-29 PROCEDURE — 94640 PR INHAL RX, AIRWAY OBST/DX SPUTUM INDUCT: ICD-10-PCS | Mod: S$GLB,,, | Performed by: PHYSICIAN ASSISTANT

## 2020-02-29 PROCEDURE — 63600175 PHARM REV CODE 636 W HCPCS: Performed by: EMERGENCY MEDICINE

## 2020-02-29 PROCEDURE — 87502 INFLUENZA DNA AMP PROBE: CPT

## 2020-02-29 PROCEDURE — 99214 PR OFFICE/OUTPT VISIT, EST, LEVL IV, 30-39 MIN: ICD-10-PCS | Mod: 25,S$GLB,, | Performed by: PHYSICIAN ASSISTANT

## 2020-02-29 PROCEDURE — 94640 AIRWAY INHALATION TREATMENT: CPT | Mod: S$GLB,,, | Performed by: PHYSICIAN ASSISTANT

## 2020-02-29 PROCEDURE — 99214 OFFICE O/P EST MOD 30 MIN: CPT | Mod: 25,S$GLB,, | Performed by: PHYSICIAN ASSISTANT

## 2020-02-29 RX ORDER — DEXAMETHASONE SODIUM PHOSPHATE 100 MG/10ML
10 INJECTION INTRAMUSCULAR; INTRAVENOUS
Status: DISCONTINUED | OUTPATIENT
Start: 2020-02-29 | End: 2020-02-29

## 2020-02-29 RX ORDER — PREDNISONE 20 MG/1
TABLET ORAL
Qty: 9 TABLET | Refills: 0 | Status: SHIPPED | OUTPATIENT
Start: 2020-02-29 | End: 2020-10-08

## 2020-02-29 RX ORDER — AMOXICILLIN 875 MG/1
875 TABLET, FILM COATED ORAL 2 TIMES DAILY
Qty: 14 TABLET | Refills: 0 | Status: SHIPPED | OUTPATIENT
Start: 2020-02-29 | End: 2020-03-07

## 2020-02-29 RX ORDER — PREDNISONE 50 MG/1
50 TABLET ORAL DAILY
Qty: 3 TABLET | Refills: 0 | Status: SHIPPED | OUTPATIENT
Start: 2020-02-29 | End: 2020-03-03

## 2020-02-29 RX ORDER — IPRATROPIUM BROMIDE 0.5 MG/2.5ML
0.5 SOLUTION RESPIRATORY (INHALATION)
Status: COMPLETED | OUTPATIENT
Start: 2020-02-29 | End: 2020-02-29

## 2020-02-29 RX ORDER — ALBUTEROL SULFATE 0.83 MG/ML
2.5 SOLUTION RESPIRATORY (INHALATION)
Status: COMPLETED | OUTPATIENT
Start: 2020-02-29 | End: 2020-02-29

## 2020-02-29 RX ORDER — ALBUTEROL SULFATE 1.25 MG/3ML
2.5 SOLUTION RESPIRATORY (INHALATION) EVERY 6 HOURS PRN
Qty: 1 BOX | Refills: 0 | Status: SHIPPED | OUTPATIENT
Start: 2020-02-29 | End: 2020-03-30

## 2020-02-29 RX ADMIN — PREDNISONE 40 MG: 20 TABLET ORAL at 12:02

## 2020-02-29 RX ADMIN — ALBUTEROL SULFATE 2.5 MG: 0.83 SOLUTION RESPIRATORY (INHALATION) at 06:02

## 2020-02-29 RX ADMIN — IPRATROPIUM BROMIDE 0.5 MG: 0.5 SOLUTION RESPIRATORY (INHALATION) at 06:02

## 2020-02-29 NOTE — ED NOTES
Pt presents to the ED secondary to SOB and cough x3 days. Pt states he has a hx of asthma and has been having worsening sob over the last 3 days. Reports giving himself breathing treatments at home and has only had temporary relief. Pt states 4 hours after his treatments he becomes SOB again. Pt's cough is non productive and is worse at night. Pt denies fever, chills, chest pain. Pt reports some relief post breathing tx but is concerned sob will return in 4 hours.

## 2020-02-29 NOTE — ED PROVIDER NOTES
"Encounter Date: 2/28/2020    SCRIBE #1 NOTE: I, Annie Patel, am scribing for, and in the presence of,  Dr. Moreno. I have scribed the entire note.       History     Chief Complaint   Patient presents with    Asthma     19y M ambulatory to ED with c/o SOB and cough worse at night x3 days. h/o asthma, no relief from treatments at home     20 y/o M presents to the ER c/o cough, SOB, CP. Onset 4 or 5 days ago. Has h/o asthma, states this feels similar to an exacerbation. Notes he has been using his home treatments "all day", and only gets temporary incomplete relief. Notes cough is nonproductive, and elicits some aching CP. Denies any fever. No exertional component reported. No other symptoms reported at this time.     The history is provided by the patient.     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     Asthma, not well controlled     Asthma, well controlled     Atopy     Bipolar 1 disorder     Hypertension     Overweight(278.02)     Psychiatric symptoms      Past Surgical History:   Procedure Laterality Date    PENIS SURGERY       Family History   Problem Relation Age of Onset    Asthma Mother     Asthma Sister     Hypertension Maternal Grandmother     Coronary artery disease Maternal Grandmother     Heart attack Maternal Grandfather      Social History     Tobacco Use    Smoking status: Current Every Day Smoker     Packs/day: 0.50     Types: Cigarettes    Smokeless tobacco: Never Used   Substance Use Topics    Alcohol use: No    Drug use: No     Review of Systems   Constitutional: Negative for chills, fatigue and fever.   HENT: Negative for congestion, sore throat and voice change.    Eyes: Negative for photophobia, pain and redness.   Respiratory: Positive for cough and shortness of breath. Negative for choking.  "   Cardiovascular: Positive for chest pain. Negative for palpitations and leg swelling.   Gastrointestinal: Negative for abdominal pain, diarrhea, nausea and vomiting.   Genitourinary: Negative for dysuria, frequency and urgency.   Musculoskeletal: Negative for back pain, neck pain and neck stiffness.   Neurological: Negative for light-headedness, numbness and headaches.   All other systems reviewed and are negative.      Physical Exam     Initial Vitals [02/28/20 2309]   BP Pulse Resp Temp SpO2   139/82 87 16 98.8 °F (37.1 °C) 98 %      MAP       --         Physical Exam    Nursing note and vitals reviewed.  Constitutional: He appears well-developed and well-nourished. No distress.   HENT:   Head: Normocephalic and atraumatic.   Mouth/Throat: Mucous membranes are dry.   Oropharynx clear   Eyes: Conjunctivae and EOM are normal. Pupils are equal, round, and reactive to light.   Neck: Normal range of motion. Neck supple. No tracheal deviation present.   Cardiovascular: Normal rate, regular rhythm, normal heart sounds and intact distal pulses.   Pulmonary/Chest: No respiratory distress. He has wheezes (Bilateral expiratory wheezing noted). He has no rhonchi. He has no rales.   Musculoskeletal: Normal range of motion. He exhibits no edema or tenderness.   Neurological: He is alert and oriented to person, place, and time. He has normal strength. No cranial nerve deficit.   Skin: Skin is warm and dry.         ED Course   Procedures  Labs Reviewed   INFLUENZA A & B BY MOLECULAR          Imaging Results    None          Medical Decision Making:   Initial Assessment:   19-year-old male presents emergency department complaining of shortness of breath  Differential Diagnosis:   Asthma, COPD, pneumonia, influenza  Clinical Tests:   Lab Tests: Reviewed       <> Summary of Lab: Influenza negative  ED Management:  Patient given prednisone, 3 neb treatments.  Vital signs remained stable. Asking for something to eat. Informed him of  results as well as plan to discharge with rx for prednisone, referral to [facility], f/u with PCP, reasons to return to the ER. Patient expressed good understanding and is comfortable with discharge at this time.             Scribe Attestation:   Scribe #1: I performed the above scribed service and the documentation accurately describes the services I performed. I attest to the accuracy of the note.                          Clinical Impression:     1. Moderate persistent asthma with exacerbation            Disposition:   Disposition: Discharged  Condition: Stable     I, Dr. Johnny Moreno, personally performed the services described in this documentation. All medical record entries made by the scribe were at my direction and in my presence.  I have reviewed the chart and agree that the record reflects my personal performance and is accurate and complete. Johnny Moreno MD.  1:17 AM 02/29/2020                        Johnny Moreno MD  02/29/20 0117

## 2020-02-29 NOTE — PROGRESS NOTES
"Subjective:       Patient ID: Vadim Moyer is a 19 y.o. male.    Vitals:  height is 5' 6" (1.676 m) and weight is 83.9 kg (185 lb). His temperature is 98.3 °F (36.8 °C). His blood pressure is 150/90 (abnormal) and his pulse is 81. His respiration is 18 and oxygen saturation is 96%.     Chief Complaint: Shortness of Breath    Vadim presents for evaluation of ear pain, congestion, cough & wheezing.  He has a history of asthma.  He went to the ED yesterday for wheezing & SOB.  He was given oral prednisone & 3 breathing treatments.  He was discharged home with prednisone 50mg x 3 days, but says he lost the Rx, so has not taken any prednisone today.  He has been using his albuterol inhaler all day today with little relief. No fevers, chills, or productive cough.      Shortness of Breath   This is a new problem. The current episode started in the past 7 days. The problem has been gradually worsening. Associated symptoms include ear pain, a sore throat, sputum production and wheezing. Pertinent negatives include no fever, hemoptysis, rash or vomiting. Nothing aggravates the symptoms. He has tried beta agonist inhalers for the symptoms. The treatment provided mild relief.       Constitution: Negative for chills, sweating, fatigue and fever.   HENT: Positive for ear pain, congestion and sore throat. Negative for postnasal drip, sinus pain, sinus pressure and voice change.    Neck: Negative for painful lymph nodes.   Eyes: Negative for eye redness.   Respiratory: Positive for cough, sputum production and wheezing. Negative for chest tightness, bloody sputum, COPD, shortness of breath, stridor and asthma.    Gastrointestinal: Negative for nausea and vomiting.   Musculoskeletal: Negative for muscle ache.   Skin: Negative for rash.   Allergic/Immunologic: Negative for seasonal allergies and asthma.   Hematologic/Lymphatic: Negative for swollen lymph nodes.       Objective:      Physical Exam   Constitutional: He is oriented " to person, place, and time. He appears well-developed and well-nourished. He is cooperative.  Non-toxic appearance. He does not have a sickly appearance. He does not appear ill. No distress.   HENT:   Head: Normocephalic and atraumatic.   Right Ear: Hearing, external ear and ear canal normal. Tympanic membrane is erythematous. Tympanic membrane is not perforated and not bulging.   Left Ear: Hearing, tympanic membrane, external ear and ear canal normal. Tympanic membrane is not perforated, not erythematous and not bulging.   Nose: Rhinorrhea present. No mucosal edema or nasal deformity. No epistaxis. Right sinus exhibits no maxillary sinus tenderness and no frontal sinus tenderness. Left sinus exhibits no maxillary sinus tenderness and no frontal sinus tenderness.   Mouth/Throat: Uvula is midline, oropharynx is clear and moist and mucous membranes are normal. No trismus in the jaw. Normal dentition. No uvula swelling. No oropharyngeal exudate, posterior oropharyngeal edema or posterior oropharyngeal erythema.   Eyes: Conjunctivae and lids are normal. No scleral icterus.   Neck: Trachea normal, full passive range of motion without pain and phonation normal. Neck supple. No neck rigidity. No edema and no erythema present.   Cardiovascular: Normal rate, regular rhythm, normal heart sounds, intact distal pulses and normal pulses.   Pulmonary/Chest: Effort normal. No accessory muscle usage or stridor. No tachypnea and no bradypnea. No respiratory distress. He has no decreased breath sounds. He has wheezes. He has no rhonchi. He has no rales.   Insp & exp wheezes throughout.   Abdominal: Normal appearance.   Musculoskeletal: Normal range of motion. He exhibits no edema or deformity.   Neurological: He is alert and oriented to person, place, and time. He exhibits normal muscle tone. Coordination normal.   Skin: Skin is warm, dry, intact, not diaphoretic and not pale.   Psychiatric: He has a normal mood and affect. His  speech is normal and behavior is normal. Judgment and thought content normal. Cognition and memory are normal.   Nursing note and vitals reviewed.      Improvement of wheezing & improvement of aeration on auscultation after breathing tx. O2 sat 98%.    Assessment:       1. Upper respiratory virus    2. Non-recurrent acute serous otitis media of right ear    3. Mild intermittent asthma with exacerbation        Plan:         Upper respiratory virus    Non-recurrent acute serous otitis media of right ear    Mild intermittent asthma with exacerbation    Other orders  -     albuterol nebulizer solution 2.5 mg  -     ipratropium 0.02 % nebulizer solution 0.5 mg  -     amoxicillin (AMOXIL) 875 MG tablet; Take 1 tablet (875 mg total) by mouth 2 (two) times daily. for 7 days  Dispense: 14 tablet; Refill: 0  -     albuterol (ACCUNEB) 1.25 mg/3 mL Nebu; Take 6 mLs (2.5 mg total) by nebulization every 6 (six) hours as needed. Rescue  Dispense: 1 Box; Refill: 0  -     methylPREDNISolone sod suc(PF) injection 125 mg     Discussed risk of steroids with patient, understanding was verbalized.  ER precautions & red flag warning were discussed with the patient. The patient verbalized understanding of the treatment plan & is in agreement.     Patient Instructions     PLEASE READ YOUR DISCHARGE INSTRUCTIONS ENTIRELY AS IT CONTAINS IMPORTANT INFORMATION.  Please follow up with primary care as scheduled.  - Rest.    - Drink plenty of fluids.    - Tylenol or Ibuprofen as directed as needed for fever/pain.    - If you were prescribed antibiotics, please take them to completion.  - If you are female and on birth control pills - please use additional methods of contraception to prevent pregnancy while on antibiotics and for one cycle after.   - If you were prescribed a narcotic medication or muscle relaxer, do not drive or operate heavy equipment or machinery while taking these medications, as they can cause drowsiness.   - If you smoke,  please stop smoking.  -You must understand that you've received an Urgent Care treatment only and that you may be released before all your medical problems are known or treated. You, the patient, will    arrange for follow up care as instructed. Please arrange follow up with your primary medical clinic as soon as possible.   - Follow up with your PCP or specialty clinic as directed in the next 1-2 weeks if not improved or as needed.  You can call [phone] to schedule an appointment with the appropriate provider.    - Please return to Urgent Care or to the Emergency Department if your symptoms worsen.  You received a steroid today - this can elevate your blood pressure, elevate your blood sugar, water weight gain, nervous energy, redness to the face and dimpling of the skin where the shot goes in if you had an injection.   Do not use steroids more than 3 times per year.   If you have diabetes, please check you blood sugar frequently.  If you have high blood pressure, please check your blood pressure frequently.     Patient aware and verbalized understanding.    Middle Ear Infection (Adult)  You have an infection of the middle ear, the space behind the eardrum. This is also called acute otitis media (AOM). Sometimes it is caused by the common cold. This is because congestion can block the internal passage (eustachian tube) that drains fluid from the middle ear. When the middle ear fills with fluid, bacteria can grow there and cause an infection. Oral antibiotics are used to treat this illness, not ear drops. Symptoms usually start to improve within 1 to 2 days of treatment.    Home care  The following are general care guidelines:  · Finish all of the antibiotic medicine given, even though you may feel better after the first few days.  · You may use over-the-counter medicine, such as acetaminophen or ibuprofen, to control pain and fever, unless something else was prescribed. If you have chronic liver or kidney  disease or have ever had a stomach ulcer or gastrointestinal bleeding, talk with your healthcare provider before using these medicines. Do not give aspirin to anyone under 18 years of age who has a fever. It may cause severe illness or death.  Follow-up care  Follow up with your healthcare provider, or as advised, in 2 weeks if all symptoms have not gotten better, or if hearing doesn't go back to normal within 1 month.  When to seek medical advice  Call your healthcare provider right away if any of these occur:  · Ear pain gets worse or does not improve after 3 days of treatment  · Unusual drowsiness or confusion  · Neck pain, stiff neck, or headache  · Fluid or blood draining from the ear canal  · Fever of 100.4°F (38°C) or as advised   · Seizure  Date Last Reviewed: 6/1/2016 © 2000-2017 [employer]. [address] [place] [address]. All rights reserved. This information is not intended as a substitute for professional medical care. Always follow your healthcare professional's instructions.        Viral Upper Respiratory Illness (Adult)  You have a viral upper respiratory illness (URI), which is another term for the common cold. This illness is contagious during the first few days. It is spread through the air by coughing and sneezing. It may also be spread by direct contact (touching the sick person and then touching your own eyes, nose, or mouth). Frequent handwashing will decrease risk of spread. Most viral illnesses go away within 7 to 10 days with rest and simple home remedies. Sometimes the illness may last for several weeks. Antibiotics will not kill a virus, and they are generally not prescribed for this condition.    Home care  · If symptoms are severe, rest at home for the first 2 to 3 days. When you resume activity, don't let yourself get too tired.  · Avoid being exposed to cigarette smoke (yours or others).  · You may use acetaminophen or ibuprofen to control pain and fever,  unless another medicine was prescribed. (Note: If you have chronic liver or kidney disease, have ever had a stomach ulcer or gastrointestinal bleeding, or are taking blood-thinning medicines, talk with your healthcare provider before using these medicines.) Aspirin should never be given to anyone under 18 years of age who is ill with a viral infection or fever. It may cause severe liver or brain damage.  · Your appetite may be poor, so a light diet is fine. Avoid dehydration by drinking 6 to 8 glasses of fluids per day (water, soft drinks, juices, tea, or soup). Extra fluids will help loosen secretions in the nose and lungs.  · Over-the-counter cold medicines will not shorten the length of time youre sick, but they may be helpful for the following symptoms: cough, sore throat, and nasal and sinus congestion. (Note: Do not use decongestants if you have high blood pressure.)  Follow-up care  Follow up with your healthcare provider, or as advised.  When to seek medical advice  Call your healthcare provider right away if any of these occur:  · Cough with lots of colored sputum (mucus)  · Severe headache; face, neck, or ear pain  · Difficulty swallowing due to throat pain  · Fever of 100.4°F (38°C)  Call 911, or get immediate medical care  Call emergency services right away if any of these occur:  · Chest pain, shortness of breath, wheezing, or difficulty breathing  · Coughing up blood  · Inability to swallow due to throat pain  Date Last Reviewed: 9/13/2015 © 2000-2017 [employer]. [address] [place] [address], [place] [address]. All rights reserved. This information is not intended as a substitute for professional medical care. Always follow your healthcare professional's instructions.        Controlling Your Asthma  You can do a lot to manage your asthma and improve your quality of life. You will need to work with your healthcare provider to develop a plan. But its up to you to put this plan into  action.  Why you need to take control  You need to control the inflammation in your lungs. Take all medicine as directed, especially controller medicines, even if you feel that your asthma is under good control. You also need to relieve symptoms when you have them. These are long-term tasks. But the more you stay in control, the better youll feel. If you dont stay in control:  · Asthma symptoms may cause you to miss school, work, or activities that you enjoy.  · Asthma flare-ups can be dangerous, even deadly.  · Uncontrolled asthma makes it more likely that you will need emergency department and in-hospital care.  · Uncontrolled asthma may cause permanent damage to your lungs.    Peak flow monitoring helps measure how open your airways are.   Taking medicine helps you control your asthma and relieve symptoms when they occur.     Using an Asthma Action Plan will help you keep track of and respond to asthma symptoms.   Avoiding triggers--the things that inflame your airways--will help prevent symptoms and flare-ups.   Your action plan  Your healthcare provider will help you prepare, and when needed, update your personal Asthma Action Plan. Your plan tells you what to do based on your current symptoms. If you don't have an Asthma Action Plan, or if yours isn't up-to-date, make sure you talk with your healthcare provider.  Date Last Reviewed: 1/1/2017  © 4561-4263 [employer]. [address] [place] [address], [place] [address]. All rights reserved. This information is not intended as a substitute for professional medical care. Always follow your healthcare professional's instructions.

## 2020-03-01 NOTE — PATIENT INSTRUCTIONS
PLEASE READ YOUR DISCHARGE INSTRUCTIONS ENTIRELY AS IT CONTAINS IMPORTANT INFORMATION.  Please follow up with primary care as scheduled.  - Rest.    - Drink plenty of fluids.    - Tylenol or Ibuprofen as directed as needed for fever/pain.    - If you were prescribed antibiotics, please take them to completion.  - If you are female and on birth control pills - please use additional methods of contraception to prevent pregnancy while on antibiotics and for one cycle after.   - If you were prescribed a narcotic medication or muscle relaxer, do not drive or operate heavy equipment or machinery while taking these medications, as they can cause drowsiness.   - If you smoke, please stop smoking.  -You must understand that you've received an Urgent Care treatment only and that you may be released before all your medical problems are known or treated. You, the patient, will    arrange for follow up care as instructed. Please arrange follow up with your primary medical clinic as soon as possible.   - Follow up with your PCP or specialty clinic as directed in the next 1-2 weeks if not improved or as needed.  You can call [phone] to schedule an appointment with the appropriate provider.    - Please return to Urgent Care or to the Emergency Department if your symptoms worsen.  You received a steroid today - this can elevate your blood pressure, elevate your blood sugar, water weight gain, nervous energy, redness to the face and dimpling of the skin where the shot goes in if you had an injection.   Do not use steroids more than 3 times per year.   If you have diabetes, please check you blood sugar frequently.  If you have high blood pressure, please check your blood pressure frequently.     Patient aware and verbalized understanding.    Middle Ear Infection (Adult)  You have an infection of the middle ear, the space behind the eardrum. This is also called acute otitis media (AOM). Sometimes it is caused by the common cold.  This is because congestion can block the internal passage (eustachian tube) that drains fluid from the middle ear. When the middle ear fills with fluid, bacteria can grow there and cause an infection. Oral antibiotics are used to treat this illness, not ear drops. Symptoms usually start to improve within 1 to 2 days of treatment.    Home care  The following are general care guidelines:  · Finish all of the antibiotic medicine given, even though you may feel better after the first few days.  · You may use over-the-counter medicine, such as acetaminophen or ibuprofen, to control pain and fever, unless something else was prescribed. If you have chronic liver or kidney disease or have ever had a stomach ulcer or gastrointestinal bleeding, talk with your healthcare provider before using these medicines. Do not give aspirin to anyone under 18 years of age who has a fever. It may cause severe illness or death.  Follow-up care  Follow up with your healthcare provider, or as advised, in 2 weeks if all symptoms have not gotten better, or if hearing doesn't go back to normal within 1 month.  When to seek medical advice  Call your healthcare provider right away if any of these occur:  · Ear pain gets worse or does not improve after 3 days of treatment  · Unusual drowsiness or confusion  · Neck pain, stiff neck, or headache  · Fluid or blood draining from the ear canal  · Fever of 100.4°F (38°C) or as advised   · Seizure  Date Last Reviewed: 6/1/2016  © 7069-5994 [employer]. [address] [place] [address]. All rights reserved. This information is not intended as a substitute for professional medical care. Always follow your healthcare professional's instructions.        Viral Upper Respiratory Illness (Adult)  You have a viral upper respiratory illness (URI), which is another term for the common cold. This illness is contagious during the first few days. It is spread through the air by coughing and  sneezing. It may also be spread by direct contact (touching the sick person and then touching your own eyes, nose, or mouth). Frequent handwashing will decrease risk of spread. Most viral illnesses go away within 7 to 10 days with rest and simple home remedies. Sometimes the illness may last for several weeks. Antibiotics will not kill a virus, and they are generally not prescribed for this condition.    Home care  · If symptoms are severe, rest at home for the first 2 to 3 days. When you resume activity, don't let yourself get too tired.  · Avoid being exposed to cigarette smoke (yours or others).  · You may use acetaminophen or ibuprofen to control pain and fever, unless another medicine was prescribed. (Note: If you have chronic liver or kidney disease, have ever had a stomach ulcer or gastrointestinal bleeding, or are taking blood-thinning medicines, talk with your healthcare provider before using these medicines.) Aspirin should never be given to anyone under 18 years of age who is ill with a viral infection or fever. It may cause severe liver or brain damage.  · Your appetite may be poor, so a light diet is fine. Avoid dehydration by drinking 6 to 8 glasses of fluids per day (water, soft drinks, juices, tea, or soup). Extra fluids will help loosen secretions in the nose and lungs.  · Over-the-counter cold medicines will not shorten the length of time youre sick, but they may be helpful for the following symptoms: cough, sore throat, and nasal and sinus congestion. (Note: Do not use decongestants if you have high blood pressure.)  Follow-up care  Follow up with your healthcare provider, or as advised.  When to seek medical advice  Call your healthcare provider right away if any of these occur:  · Cough with lots of colored sputum (mucus)  · Severe headache; face, neck, or ear pain  · Difficulty swallowing due to throat pain  · Fever of 100.4°F (38°C)  Call 911, or get immediate medical care  Call emergency  services right away if any of these occur:  · Chest pain, shortness of breath, wheezing, or difficulty breathing  · Coughing up blood  · Inability to swallow due to throat pain  Date Last Reviewed: 9/13/2015  © 9250-6807 [employer]. [address] [place] [address], [place] [address]. All rights reserved. This information is not intended as a substitute for professional medical care. Always follow your healthcare professional's instructions.        Controlling Your Asthma  You can do a lot to manage your asthma and improve your quality of life. You will need to work with your healthcare provider to develop a plan. But its up to you to put this plan into action.  Why you need to take control  You need to control the inflammation in your lungs. Take all medicine as directed, especially controller medicines, even if you feel that your asthma is under good control. You also need to relieve symptoms when you have them. These are long-term tasks. But the more you stay in control, the better youll feel. If you dont stay in control:  · Asthma symptoms may cause you to miss school, work, or activities that you enjoy.  · Asthma flare-ups can be dangerous, even deadly.  · Uncontrolled asthma makes it more likely that you will need emergency department and in-hospital care.  · Uncontrolled asthma may cause permanent damage to your lungs.    Peak flow monitoring helps measure how open your airways are.   Taking medicine helps you control your asthma and relieve symptoms when they occur.     Using an Asthma Action Plan will help you keep track of and respond to asthma symptoms.   Avoiding triggers--the things that inflame your airways--will help prevent symptoms and flare-ups.   Your action plan  Your healthcare provider will help you prepare, and when needed, update your personal Asthma Action Plan. Your plan tells you what to do based on your current symptoms. If you don't have an Asthma Action Plan, or if yours isn't  up-to-date, make sure you talk with your healthcare provider.  Date Last Reviewed: 1/1/2017  © 6416-9536 [employer], [employer]. [address] [place] [address], [place] [address]. All rights reserved. This information is not intended as a substitute for professional medical care. Always follow your healthcare professional's instructions.

## 2020-03-02 ENCOUNTER — HOSPITAL ENCOUNTER (EMERGENCY)
Facility: HOSPITAL | Age: 20
Discharge: HOME | End: 2020-03-02
Attending: EMERGENCY MEDICINE
Payer: MEDICAID

## 2020-03-02 VITALS
RESPIRATION RATE: 20 BRPM | OXYGEN SATURATION: 97 % | HEIGHT: 66 IN | WEIGHT: 185.19 LBS | BODY MASS INDEX: 29.76 KG/M2 | SYSTOLIC BLOOD PRESSURE: 141 MMHG | HEART RATE: 61 BPM | TEMPERATURE: 98 F | DIASTOLIC BLOOD PRESSURE: 90 MMHG

## 2020-03-02 DIAGNOSIS — J45.901 MILD ASTHMA WITH EXACERBATION, UNSPECIFIED WHETHER PERSISTENT: Primary | ICD-10-CM

## 2020-03-02 LAB
BUN SERPL-MCNC: 16 MG/DL (ref 6–30)
CHLORIDE SERPL-SCNC: 106 MMOL/L (ref 95–110)
CREAT SERPL-MCNC: 0.6 MG/DL (ref 0.5–1.4)
GLUCOSE SERPL-MCNC: 86 MG/DL (ref 70–110)
HCT VFR BLD CALC: 51 %PCV (ref 36–54)
POC IONIZED CALCIUM: 1.17 MMOL/L (ref 1.06–1.42)
POC TCO2 (MEASURED): 28 MMOL/L (ref 23–29)
POTASSIUM BLD-SCNC: 4.3 MMOL/L (ref 3.5–5.1)
SAMPLE: NORMAL
SODIUM BLD-SCNC: 140 MMOL/L (ref 136–145)

## 2020-03-02 PROCEDURE — 94761 N-INVAS EAR/PLS OXIMETRY MLT: CPT

## 2020-03-02 PROCEDURE — 99283 EMERGENCY DEPT VISIT LOW MDM: CPT | Mod: 25

## 2020-03-02 PROCEDURE — 80047 BASIC METABLC PNL IONIZED CA: CPT

## 2020-03-02 PROCEDURE — 99285 PR EMERGENCY DEPT VISIT,LEVEL V: ICD-10-PCS | Mod: ,,, | Performed by: PHYSICIAN ASSISTANT

## 2020-03-02 PROCEDURE — 94640 AIRWAY INHALATION TREATMENT: CPT

## 2020-03-02 PROCEDURE — 63600175 PHARM REV CODE 636 W HCPCS: Performed by: PHYSICIAN ASSISTANT

## 2020-03-02 PROCEDURE — 25000242 PHARM REV CODE 250 ALT 637 W/ HCPCS: Performed by: PHYSICIAN ASSISTANT

## 2020-03-02 PROCEDURE — 99285 EMERGENCY DEPT VISIT HI MDM: CPT | Mod: ,,, | Performed by: PHYSICIAN ASSISTANT

## 2020-03-02 RX ORDER — IPRATROPIUM BROMIDE AND ALBUTEROL SULFATE 2.5; .5 MG/3ML; MG/3ML
3 SOLUTION RESPIRATORY (INHALATION)
Status: COMPLETED | OUTPATIENT
Start: 2020-03-02 | End: 2020-03-02

## 2020-03-02 RX ORDER — PREDNISONE 20 MG/1
60 TABLET ORAL
Status: COMPLETED | OUTPATIENT
Start: 2020-03-02 | End: 2020-03-02

## 2020-03-02 RX ADMIN — IPRATROPIUM BROMIDE AND ALBUTEROL SULFATE 3 ML: .5; 3 SOLUTION RESPIRATORY (INHALATION) at 02:03

## 2020-03-02 RX ADMIN — PREDNISONE 60 MG: 20 TABLET ORAL at 02:03

## 2020-03-02 NOTE — ED TRIAGE NOTES
"Vadim Moyer, a 19 y.o. male presents to the ED    Triage note:  Chief Complaint   Patient presents with    URI     pt dx with URI earlier in the week.  also reports vomiting and diarrhea x1    Diarrhea     Pt reports one episode of diarrhea with dark blood "coffee grind" this morning with abd pain; denies vomiting.     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 Oriented x3, speaking appropriately  APPEARANCE: Pt resting comfortably, in no acute distress, pt is clean and well groomed, clothing properly fastened  SKIN: Skin warm, dry and intact, normal skin turgor, moist mucus membranes  RESPIRATORY: Airway is open and patent, respirations are spontaneous, even and unlabored, normal effort and rate  CARDIAC: Normal rate and rhythm, no peripheral edema noted, capillary refill < 3 seconds, bilateral radial pulses 2+  ABDOMEN: Soft, nontender, nondistended. +diarrhea with dark blood and generalized abd pain  NEUROLOGIC: PERRL, facial expression is symmetrical, patient moving all extremities spontaneously, normal sensation in all extremities when touched with a finger.  Follows all commands appropriately  MUSCULOSKELETAL: No obvious deformities.      "

## 2020-03-02 NOTE — ED PROVIDER NOTES
Encounter Date: 3/2/2020       History     Chief Complaint   Patient presents with    URI     pt dx with URI earlier in the week.  also reports vomiting and diarrhea x1    Diarrhea     Patient is a 19 year old male with PMHX of bipolar disorder, HTN, and asthma. He presents to the ED via EMS for SOB. Patient recently seen twice for similar presentation. He reports having increased SOB for approximately one week. Reports current steroid use. Reports frequent use of albuterol nebulizer without relief of symptoms. Reports associated dry non productive cough. Patient reports compliance with current abx of amoxil. He denies fever,chills, nausea, vomiting, chest pain, abd pain, dysuria, diarrhea, or constipation. He is a current everyday smoker and denies alcohol use.    Patient denies melena, hematemesis, or BRBPR despite triage note per nursing staff. Serial questioning witnessed by nursing staff.     The history is provided by the patient and medical records. No  was used.     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     Asthma, not well controlled     Asthma, well controlled     Atopy     Bipolar 1 disorder     Hypertension     Overweight(278.02)     Psychiatric symptoms      Past Surgical History:   Procedure Laterality Date    PENIS SURGERY       Family History   Problem Relation Age of Onset    Asthma Mother     Asthma Sister     Hypertension Maternal Grandmother     Coronary artery disease Maternal Grandmother     Heart attack Maternal Grandfather      Social History     Tobacco Use    Smoking status: Current Every Day Smoker     Packs/day: 0.50     Types: Cigarettes    Smokeless tobacco: Never Used   Substance Use Topics    Alcohol use: No    Drug use: No     Review of Systems   Constitutional: Negative  for fever.   HENT: Negative for sore throat.    Respiratory: Positive for cough and shortness of breath.    Cardiovascular: Negative for chest pain.   Gastrointestinal: Negative for abdominal pain, nausea and vomiting.   Genitourinary: Negative for dysuria.   Musculoskeletal: Negative for back pain.   Skin: Negative for rash.   Neurological: Negative for weakness.   Hematological: Does not bruise/bleed easily.       Physical Exam     Initial Vitals [03/02/20 0149]   BP Pulse Resp Temp SpO2   (!) 170/100 74 18 97.9 °F (36.6 °C) 98 %      MAP       --         Physical Exam    Vitals reviewed.  Constitutional: He appears well-developed and well-nourished. No distress.   HENT:   Head: Normocephalic.   Eyes: Conjunctivae are normal.   Neck: Normal range of motion.   Cardiovascular: Normal rate and regular rhythm.   No murmur heard.  Pulmonary/Chest: No respiratory distress. He has wheezes. He has no rales.   Abdominal: Soft. Bowel sounds are normal. He exhibits no distension. There is no tenderness.   Musculoskeletal: Normal range of motion.   Neurological: He is alert and oriented to person, place, and time.   Skin: Skin is warm and dry. No erythema.         ED Course   Procedures  Labs Reviewed   ISTAT PROCEDURE          Imaging Results          X-Ray Chest PA And Lateral (Final result)  Result time 03/02/20 02:37:04    Final result by Maximino Yoder MD (03/02/20 02:37:04)                 Impression:      No acute cardiopulmonary process.      Electronically signed by: Maximino Yoder MD  Date:    03/02/2020  Time:    02:37             Narrative:    EXAMINATION:  XR CHEST PA AND LATERAL    CLINICAL HISTORY:  Shortness of breath    TECHNIQUE:  PA and lateral views of the chest were performed.    COMPARISON:  March 17, 2017.    FINDINGS:  There is no consolidation, effusion, or pneumothorax.    Cardiomediastinal silhouette is unremarkable.    Regional osseous structures are unremarkable.                                  Medical Decision Making:   History:   Old Medical Records: I decided to obtain old medical records.  Independently Interpreted Test(s):   I have ordered and independently interpreted X-rays - see summary below.  Clinical Tests:   Lab Tests: Ordered and Reviewed  Radiological Study: Ordered and Reviewed       APC / Resident Notes:   Patient is a 19 year old male presents to the ED for emergent evaluation of SOB.     Will order istat and CXR. Will order steroid and nebulizer treatment for symptomatic relief. Will continue to monitor.     Differential diagnoses include, but are not limited to: asthma exacerbation, COPD exacerbation, bronchitis, or electrolyte imbalance.     istat WNL. CXR found to have No acute cardiopulmonary process. Patient tolerating PO without difficulty or emesis.     Patient reassessed, reports symptoms improved with ED management. I have discussed emergency department findings, and plan with the patient. Patient instructed to continue current prescriptions of steroid, nebulizer, and abx. Will discharge home with F/U with PCP. Patient verbalizes understanding of plan and agrees. Return precautions given.     I have discussed and reviewed with my supervising physician.        Clinical Impression:       ICD-10-CM ICD-9-CM   1. Mild asthma with exacerbation, unspecified whether persistent J45.901 493.92         Disposition:   Disposition: Discharged  Condition: Stable     ED Disposition Condition    Discharge Stable        ED Prescriptions     None        Follow-up Information     Follow up With Specialties Details Why Contact Info    [facility] - Vadim Gonzalez    [address] [facility] [id number] [phone]      [facility]    [address] [facility]  [place] [address]  [phone]      [facility] [address], [address]  [facility] [address] [phone]                                     Pat RICE  ODETTE Plascencia  03/02/20 0801

## 2020-04-05 ENCOUNTER — HOSPITAL ENCOUNTER (EMERGENCY)
Facility: HOSPITAL | Age: 20
Discharge: HOME | End: 2020-04-05
Attending: EMERGENCY MEDICINE
Payer: MEDICAID

## 2020-04-05 VITALS
SYSTOLIC BLOOD PRESSURE: 139 MMHG | HEART RATE: 64 BPM | RESPIRATION RATE: 14 BRPM | HEIGHT: 65 IN | DIASTOLIC BLOOD PRESSURE: 72 MMHG | BODY MASS INDEX: 29.16 KG/M2 | OXYGEN SATURATION: 99 % | WEIGHT: 175 LBS | TEMPERATURE: 99 F

## 2020-04-05 DIAGNOSIS — S01.81XA LACERATION OF FOREHEAD, INITIAL ENCOUNTER: Primary | ICD-10-CM
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
## 2020-04-05 PROCEDURE — 25000003 PHARM REV CODE 250: Performed by: EMERGENCY MEDICINE

## 2020-04-05 PROCEDURE — 99284 EMERGENCY DEPT VISIT MOD MDM: CPT | Mod: 25

## 2020-04-05 PROCEDURE — 12051 INTMD RPR FACE/MM 2.5 CM/<: CPT

## 2020-04-05 PROCEDURE — 12011 RPR F/E/E/N/L/M 2.5 CM/<: CPT

## 2020-04-05 RX ORDER — LIDOCAINE HYDROCHLORIDE 10 MG/ML
10 INJECTION INFILTRATION; PERINEURAL
Status: COMPLETED | OUTPATIENT
Start: 2020-04-05 | End: 2020-04-05

## 2020-04-05 RX ADMIN — Medication: at 04:04

## 2020-04-05 RX ADMIN — LIDOCAINE HYDROCHLORIDE 10 ML: 10 INJECTION, SOLUTION INFILTRATION; PERINEURAL at 04:04
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
## 2020-04-05 NOTE — DISCHARGE INSTRUCTIONS
Have staples removed in 7 days.  Return to the ED for any new or worsening symptoms.  Follow up with your primary care physician in the next 2-3 days if possible for recheck of today's complaints.

## 2020-04-05 NOTE — ED NOTES
Pt wound cleaned with saline and 4x4s used. LETS applied to wound and covered with clean gauze. Awaiting physician for lac repair.

## 2020-04-06 NOTE — ED NOTES
Pt's wound dressed with telfa and tape. Pt given instructions of care and when to return for staple removal. Pt agreeable to plan of care and is ambulatory, stable, and without distress.
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
## 2020-04-13 ENCOUNTER — CLINICAL SUPPORT (OUTPATIENT)
Dept: URGENT CARE | Facility: CLINIC | Age: 20
End: 2020-04-13
Payer: MEDICAID

## 2020-04-13 VITALS
TEMPERATURE: 97 F | WEIGHT: 175 LBS | OXYGEN SATURATION: 99 % | HEART RATE: 84 BPM | HEIGHT: 65 IN | RESPIRATION RATE: 13 BRPM | BODY MASS INDEX: 29.16 KG/M2

## 2020-04-13 DIAGNOSIS — T14.8XXA STAPLED SKIN WOUND: Primary | ICD-10-CM

## 2020-04-13 PROCEDURE — 99499 UNLISTED E&M SERVICE: CPT | Mod: S$GLB,,, | Performed by: NURSE PRACTITIONER

## 2020-04-13 PROCEDURE — 99499 NO LOS: ICD-10-PCS | Mod: S$GLB,,, | Performed by: NURSE PRACTITIONER

## 2020-04-13 NOTE — PROGRESS NOTES
"Subjective:       Patient ID: Vadim Moyer is a 19 y.o. male.    Vitals:  height is 5' 5" (1.651 m) and weight is 79.4 kg (175 lb). His tympanic temperature is 97.3 °F (36.3 °C). His pulse is 84. His respiration is 13 and oxygen saturation is 99%.     Chief Complaint: Suture / Staple Removal (Right Forehead)    Suture / Staple Removal   The sutures were placed 11 to 14 days ago. There has been no drainage from the wound. There is no redness present. There is no swelling present.       Constitution: Negative for fever.   HENT: Negative for ear pain and ear discharge.    Neck: Negative for painful lymph nodes.   Eyes: Negative for eye trauma.   Respiratory: Negative for sleep apnea and chest tightness.    Gastrointestinal: Negative for abdominal trauma and abdominal pain.   Endocrine: hair loss.   Genitourinary: Negative for dysuria.   Musculoskeletal: Negative for pain and trauma.   Skin: Negative for erythema.   Allergic/Immunologic: Negative for environmental allergies and seasonal allergies.   Neurological: Negative for headaches.   Hematologic/Lymphatic: Negative for swollen lymph nodes.       Objective:      Physical Exam   Constitutional: He is oriented to person, place, and time. He appears well-developed and well-nourished. He is cooperative.  Non-toxic appearance. He does not appear ill. No distress.   HENT:   Head: Normocephalic and atraumatic. Head is without abrasion, without contusion and without laceration.   Right Ear: Hearing, tympanic membrane, external ear and ear canal normal. No hemotympanum.   Left Ear: Hearing, tympanic membrane, external ear and ear canal normal. No hemotympanum.   Nose: Nose normal. No mucosal edema, rhinorrhea or nasal deformity. No epistaxis. Right sinus exhibits no maxillary sinus tenderness and no frontal sinus tenderness. Left sinus exhibits no maxillary sinus tenderness and no frontal sinus tenderness.   Mouth/Throat: Uvula is midline, oropharynx is clear and moist " and mucous membranes are normal. No trismus in the jaw. Normal dentition. No uvula swelling. No posterior oropharyngeal erythema.   Eyes: Pupils are equal, round, and reactive to light. Conjunctivae, EOM and lids are normal. Right eye exhibits no discharge. Left eye exhibits no discharge. No scleral icterus.   Neck: Trachea normal, normal range of motion, full passive range of motion without pain and phonation normal. Neck supple. No spinous process tenderness and no muscular tenderness present. No neck rigidity. No tracheal deviation present.   Cardiovascular: Normal rate, regular rhythm, normal heart sounds, intact distal pulses and normal pulses.   Pulmonary/Chest: Effort normal and breath sounds normal. No stridor. No respiratory distress.   Abdominal: Soft. Normal appearance and bowel sounds are normal. He exhibits no distension, no pulsatile midline mass and no mass. There is no tenderness.   Musculoskeletal: Normal range of motion. He exhibits no edema or deformity.   Neurological: He is alert and oriented to person, place, and time. He has normal strength. No cranial nerve deficit or sensory deficit. He exhibits normal muscle tone. He displays no seizure activity. Coordination normal. GCS eye subscore is 4. GCS verbal subscore is 5. GCS motor subscore is 6.   Skin: Skin is warm, dry, intact, not diaphoretic, not pale and no rash. Capillary refill takes less than 2 seconds. abrasion, burn, bruising, erythema and ecchymosis       Psychiatric: He has a normal mood and affect. His speech is normal and behavior is normal. Judgment and thought content normal. Cognition and memory are normal.   Nursing note and vitals reviewed.              Advised patient to return to clinic in 2-3 days for suture removal after proper healing of the wound.   Strict precautions given to patient to monitor for worsening signs and symptoms. Advised to follow up with primary.All questions answered. Strict ER precautions given. If your  symptoms worsens of fail to improve you should go to the Emergency Room. Patient voiced understanding and in agreement with current treatment plan.        Assessment:       1. Stapled skin wound        Plan:         Stapled skin wound      Patient Instructions     Please return here or go to the Emergency Department for any concerns or worsening of condition.  If you were prescribed antibiotics, please take them to completion.  If you were prescribed a narcotic medication, do not drive or operate heavy equipment or machinery while taking these medications.  Please follow up with your primary care doctor or specialist as needed.  If you  smoke, please stop smoking.  Suture Care    Stitches (sutures) are used to close wounds. Sutures also help stop bleeding and speed healing. To help your wound heal, follow the tips on this handout.  Some sutures need to be removed by a healthcare provider. Others dissolve on their own. Sometimes strips of tape are used. Youll be told what kind of sutures you have.   Keep sutures clean  · Avoid doing things that could cause dirt or sweat to get on your sutures. If needed, cover your sutures with a bandage (dressing) to protect them.  · Dont pick at scabs. They help protect the wound.  · Dont wash the area around your sutures unless your healthcare provider says its OK. Then, follow his or her instructions for washing and drying.  Keep sutures dry  · Keep your sutures out of water.  · Take a sponge bath to avoid getting your sutures wound wet, unless your healthcare provider tells you otherwise.  · Ask your provider when can you take a shower or bathe.  · Ask your provider about the best way to keep your sutures dry when bathing or showering.  · If sutures get damp, pat them dry.  Changing your dressing  Leave the dressing in place until you are told to remove it or change it. Change it only as directed, using clean hands:  · After the first ___hours, change your dressing every  ___hours.  · Change your dressing if it gets wet or dirty.  Other tips  · To help wounds on an arm or leg heal, use the affected limb as little as possible.  · To help reduce swelling and throbbing, raise the area with sutures above your heart.  · To help prevent itching, cover sutures with gauze. If sutures itch, try not to scratch them.  · For pain relief, try acetaminophen or ibuprofen. Dont use aspirin. It can increase bleeding.  When to seek medical care  Call your healthcare provider if you notice any of the following signs:  · Increased soreness, pain, or tenderness after 24 hours  · A red streak, increased redness, or puffiness near the wound  · White, yellowish, or bad smelling discharge from the wound  · Bleeding that cant be stopped by applying pressure  · Steri-Strips fall off or stitches dissolve before the wound heals  · Fever over 100.4°F (38.0°C)   Date Last Reviewed: 7/1/2016  © 8071-5238 [employer]. [address] [place] [address], [place] [address]. All rights reserved. This information is not intended as a substitute for professional medical care. Always follow your healthcare professional's instructions.

## 2020-04-13 NOTE — PATIENT INSTRUCTIONS
Please return here or go to the Emergency Department for any concerns or worsening of condition.  If you were prescribed antibiotics, please take them to completion.  If you were prescribed a narcotic medication, do not drive or operate heavy equipment or machinery while taking these medications.  Please follow up with your primary care doctor or specialist as needed.  If you  smoke, please stop smoking.  Suture Care    Stitches (sutures) are used to close wounds. Sutures also help stop bleeding and speed healing. To help your wound heal, follow the tips on this handout.  Some sutures need to be removed by a healthcare provider. Others dissolve on their own. Sometimes strips of tape are used. Youll be told what kind of sutures you have.   Keep sutures clean  · Avoid doing things that could cause dirt or sweat to get on your sutures. If needed, cover your sutures with a bandage (dressing) to protect them.  · Dont pick at scabs. They help protect the wound.  · Dont wash the area around your sutures unless your healthcare provider says its OK. Then, follow his or her instructions for washing and drying.  Keep sutures dry  · Keep your sutures out of water.  · Take a sponge bath to avoid getting your sutures wound wet, unless your healthcare provider tells you otherwise.  · Ask your provider when can you take a shower or bathe.  · Ask your provider about the best way to keep your sutures dry when bathing or showering.  · If sutures get damp, pat them dry.  Changing your dressing  Leave the dressing in place until you are told to remove it or change it. Change it only as directed, using clean hands:  · After the first ___hours, change your dressing every ___hours.  · Change your dressing if it gets wet or dirty.  Other tips  · To help wounds on an arm or leg heal, use the affected limb as little as possible.  · To help reduce swelling and throbbing, raise the area with sutures above your heart.  · To help prevent  itching, cover sutures with gauze. If sutures itch, try not to scratch them.  · For pain relief, try acetaminophen or ibuprofen. Dont use aspirin. It can increase bleeding.  When to seek medical care  Call your healthcare provider if you notice any of the following signs:  · Increased soreness, pain, or tenderness after 24 hours  · A red streak, increased redness, or puffiness near the wound  · White, yellowish, or bad smelling discharge from the wound  · Bleeding that cant be stopped by applying pressure  · Steri-Strips fall off or stitches dissolve before the wound heals  · Fever over 100.4°F (38.0°C)   Date Last Reviewed: 7/1/2016  © 1683-3231 [employer]. [address] [place] [address], [place] [address]. All rights reserved. This information is not intended as a substitute for professional medical care. Always follow your healthcare professional's instructions.

## 2020-04-25 ENCOUNTER — HOSPITAL ENCOUNTER (EMERGENCY)
Facility: HOSPITAL | Age: 20
Discharge: HOME | End: 2020-04-25
Attending: EMERGENCY MEDICINE
Payer: MEDICAID

## 2020-04-25 VITALS
WEIGHT: 175 LBS | HEIGHT: 65 IN | HEART RATE: 74 BPM | BODY MASS INDEX: 29.16 KG/M2 | TEMPERATURE: 98 F | SYSTOLIC BLOOD PRESSURE: 173 MMHG | RESPIRATION RATE: 12 BRPM | OXYGEN SATURATION: 99 % | DIASTOLIC BLOOD PRESSURE: 93 MMHG

## 2020-04-25 DIAGNOSIS — S01.81XA FACIAL LACERATION, INITIAL ENCOUNTER: Primary | ICD-10-CM

## 2020-04-25 PROCEDURE — 25000003 PHARM REV CODE 250: Performed by: EMERGENCY MEDICINE

## 2020-04-25 PROCEDURE — 99284 EMERGENCY DEPT VISIT MOD MDM: CPT | Mod: 25

## 2020-04-25 PROCEDURE — 12013 RPR F/E/E/N/L/M 2.6-5.0 CM: CPT

## 2020-04-25 RX ORDER — CEPHALEXIN 250 MG/1
500 CAPSULE ORAL EVERY 12 HOURS
Qty: 20 CAPSULE | Refills: 0 | Status: SHIPPED | OUTPATIENT
Start: 2020-04-25 | End: 2020-04-30

## 2020-04-25 RX ORDER — LIDOCAINE HYDROCHLORIDE 10 MG/ML
1 INJECTION INFILTRATION; PERINEURAL
Status: COMPLETED | OUTPATIENT
Start: 2020-04-25 | End: 2020-04-25

## 2020-04-25 RX ORDER — IBUPROFEN 600 MG/1
600 TABLET ORAL
Status: COMPLETED | OUTPATIENT
Start: 2020-04-25 | End: 2020-04-25

## 2020-04-25 RX ORDER — BACITRACIN 500 [USP'U]/G
OINTMENT TOPICAL
Status: COMPLETED | OUTPATIENT
Start: 2020-04-25 | End: 2020-04-25

## 2020-04-25 RX ADMIN — LIDOCAINE HYDROCHLORIDE 1 ML: 10 INJECTION, SOLUTION INFILTRATION; PERINEURAL at 08:04

## 2020-04-25 RX ADMIN — BACITRACIN: 500 OINTMENT TOPICAL at 10:04

## 2020-04-25 RX ADMIN — Medication 1.5 ML: at 08:04

## 2020-04-25 RX ADMIN — IBUPROFEN 600 MG: 600 TABLET, FILM COATED ORAL at 08:04

## 2020-04-26 NOTE — DISCHARGE INSTRUCTIONS
Return to the emergency department if you experience any swelling, redness, drainage, increased pain from your laceration site.  Please take your antibiotics daily as prescribed to prevent any infection.  After eating and drinking please rinse your mouth out with water.
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
## 2020-04-26 NOTE — ED NOTES
Pt gave permission for ED staff to speak about his care over the phone with is mother. Mother updated on POC at this time.
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
## 2020-04-26 NOTE — ED NOTES
3 sutures inserted to both sides of cheek. Patient aware of signs and symptoms of infection and when to return for suture removal.

## 2020-07-02 ENCOUNTER — LAB VISIT (OUTPATIENT)
Dept: PRIMARY CARE CLINIC | Facility: OTHER | Age: 20
End: 2020-07-02
Attending: INTERNAL MEDICINE
Payer: MEDICAID

## 2020-07-02 DIAGNOSIS — Z03.818 ENCOUNTER FOR OBSERVATION FOR SUSPECTED EXPOSURE TO OTHER BIOLOGICAL AGENTS RULED OUT: ICD-10-CM

## 2020-07-02 PROCEDURE — U0003 INFECTIOUS AGENT DETECTION BY NUCLEIC ACID (DNA OR RNA); SEVERE ACUTE RESPIRATORY SYNDROME CORONAVIRUS 2 (SARS-COV-2) (CORONAVIRUS DISEASE [COVID-19]), AMPLIFIED PROBE TECHNIQUE, MAKING USE OF HIGH THROUGHPUT TECHNOLOGIES AS DESCRIBED BY CMS-2020-01-R: HCPCS

## 2020-07-05 ENCOUNTER — HOSPITAL ENCOUNTER (EMERGENCY)
Facility: HOSPITAL | Age: 20
Discharge: HOME | End: 2020-07-05
Attending: EMERGENCY MEDICINE
Payer: MEDICAID

## 2020-07-05 VITALS
HEART RATE: 66 BPM | TEMPERATURE: 99 F | DIASTOLIC BLOOD PRESSURE: 84 MMHG | SYSTOLIC BLOOD PRESSURE: 147 MMHG | OXYGEN SATURATION: 100 % | BODY MASS INDEX: 26.61 KG/M2 | RESPIRATION RATE: 17 BRPM | HEIGHT: 68 IN

## 2020-07-05 DIAGNOSIS — Z71.1 CONCERN ABOUT STD IN MALE WITHOUT DIAGNOSIS: Primary | ICD-10-CM

## 2020-07-05 DIAGNOSIS — R30.0 DYSURIA: ICD-10-CM

## 2020-07-05 LAB
BILIRUB UR QL STRIP: NEGATIVE
CLARITY UR: CLEAR
COLOR UR: YELLOW
GLUCOSE UR QL STRIP: NEGATIVE
HGB UR QL STRIP: NEGATIVE
KETONES UR QL STRIP: NEGATIVE
LEUKOCYTE ESTERASE UR QL STRIP: NEGATIVE
NITRITE UR QL STRIP: NEGATIVE
PH UR STRIP: 6 [PH] (ref 5–8)
PROT UR QL STRIP: NEGATIVE
SARS-COV-2 RNA RESP QL NAA+PROBE: NEGATIVE
SP GR UR STRIP: 1.02 (ref 1–1.03)
URN SPEC COLLECT METH UR: NORMAL
UROBILINOGEN UR STRIP-ACNC: NEGATIVE EU/DL

## 2020-07-05 PROCEDURE — 81003 URINALYSIS AUTO W/O SCOPE: CPT

## 2020-07-05 PROCEDURE — 99282 EMERGENCY DEPT VISIT SF MDM: CPT

## 2020-07-05 NOTE — ED PROVIDER NOTES
"Encounter Date: 7/5/2020       History     Chief Complaint   Patient presents with    Exposure to STD     pt complaisn of burning with urination and 2 " pimple like lesions on penis"      Patient is a 19-year-old male who complains of dysuria.  Patient is concerned that he may have a STD due to having unprotected sexual intercourse about 3 days ago.  He denies penile discharge.  He is also concerned about a bump on his penis.  No fever or chills.  No abdominal or lower back pain.        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     Asthma, not well controlled     Asthma, well controlled     Atopy     Bipolar 1 disorder     Hypertension     Overweight(278.02)     Psychiatric symptoms      Past Surgical History:   Procedure Laterality Date    PENIS SURGERY       Family History   Problem Relation Age of Onset    Asthma Mother     Asthma Sister     Hypertension Maternal Grandmother     Coronary artery disease Maternal Grandmother     Heart attack Maternal Grandfather      Social History     Tobacco Use    Smoking status: Current Every Day Smoker     Packs/day: 0.50     Types: Cigarettes    Smokeless tobacco: Never Used   Substance Use Topics    Alcohol use: No    Drug use: No     Review of Systems   Constitutional: Negative for chills and fever.   Gastrointestinal: Negative for abdominal pain.   Genitourinary: Positive for dysuria. Negative for discharge, penile pain, penile swelling and testicular pain.   Musculoskeletal: Negative for back pain.   All other systems reviewed and are negative.      Physical Exam     Initial Vitals [07/05/20 1501]   BP Pulse Resp Temp SpO2   (!) 147/84 66 17 98.6 °F (37 °C) 100 %      MAP       --         Physical Exam    Nursing note and vitals reviewed.  Constitutional: No distress.   HENT:   Head: " Atraumatic.   Neck: Neck supple.   Cardiovascular: Normal rate, regular rhythm and normal heart sounds.   Pulmonary/Chest: Breath sounds normal.   Abdominal: Soft. There is no abdominal tenderness.   Genitourinary:    Penis normal.   No discharge found.    Genitourinary Comments: No testicular swelling or tenderness.     Neurological: He is alert and oriented to person, place, and time.   Skin: Skin is warm and dry.         ED Course   Procedures  Labs Reviewed   URINALYSIS          Imaging Results    None          Medical Decision Making:   Clinical Tests:   Lab Tests: Reviewed and Ordered  The following lab test(s) were unremarkable: Urinalysis       <> Summary of Lab: Normal.  ED Management:  19-year-old male with dysuria.  Urinalysis is normal and I see no signs of a sexually transmitted disease.  Patient will follow-up as needed.                                 Clinical Impression:       ICD-10-CM ICD-9-CM   1. Concern about STD in male without diagnosis  Z71.1 V65.5   2. Dysuria  R30.0 788.1             ED Disposition Condition    Discharge Stable        ED Prescriptions     None        Follow-up Information     Follow up With Specialties Details Why Contact Info    your primary doctor   As needed     [facility]  If symptoms worsen [address] [facility] [id number] [phone]                                     Jude Lewis MD  07/05/20 4422

## 2020-07-05 NOTE — ED TRIAGE NOTES
Present to ED due to burning while urination after unprotected sex. Report 2 bumps on head of penis.

## 2020-07-05 NOTE — ED NOTES
APPEARANCE: Alert, oriented and in no acute distress.  CARDIAC: Normal rate and rhythm, no murmur heard.   PERIPHERAL VASCULAR: peripheral pulses present. Normal cap refill. No edema. Warm to touch.    RESPIRATORY:Normal rate and effort, breath sounds clear bilaterally throughout chest. Respirations are equal and unlabored no obvious signs of distress.  GASTRO: soft, bowel sounds normal, no tenderness, no abdominal distention.  MUSC: Full ROM. No bony tenderness or soft tissue tenderness. No obvious deformity.  SKIN: Skin is warm and dry, normal skin turgor, mucous membranes moist.  NEURO: 5/5 strength major flexors/extensors bilaterally. Sensory intact to light touch bilaterally. [place] coma scale: eyes open spontaneously-4, oriented & converses-5, obeys commands-6. No neurological abnormalities.   MENTAL STATUS: awake, alert and aware of environment.  COMPLAIN OF BURNING WITH URINATION AFTER UNPROTECTED SEX, REPORT 2 BUMPS ON HEAD OF PENIS.  EYE: PERRL, both eyes: pupils brisk and reactive to light. Normal size.  ENT: EARS: no obvious drainage. NOSE: no active bleeding.

## 2020-07-24 ENCOUNTER — OFFICE VISIT (OUTPATIENT)
Dept: URGENT CARE | Facility: CLINIC | Age: 20
End: 2020-07-24
Payer: MEDICAID

## 2020-07-24 VITALS
WEIGHT: 175 LBS | BODY MASS INDEX: 26.52 KG/M2 | HEART RATE: 96 BPM | DIASTOLIC BLOOD PRESSURE: 80 MMHG | HEIGHT: 68 IN | OXYGEN SATURATION: 98 % | RESPIRATION RATE: 16 BRPM | SYSTOLIC BLOOD PRESSURE: 138 MMHG | TEMPERATURE: 98 F

## 2020-07-24 DIAGNOSIS — B34.9 VIRAL SYNDROME: Primary | ICD-10-CM

## 2020-07-24 DIAGNOSIS — Z03.818 ENCOUNTER FOR OBSERVATION FOR SUSPECTED EXPOSURE TO OTHER BIOLOGICAL AGENTS RULED OUT: ICD-10-CM

## 2020-07-24 PROCEDURE — U0003 INFECTIOUS AGENT DETECTION BY NUCLEIC ACID (DNA OR RNA); SEVERE ACUTE RESPIRATORY SYNDROME CORONAVIRUS 2 (SARS-COV-2) (CORONAVIRUS DISEASE [COVID-19]), AMPLIFIED PROBE TECHNIQUE, MAKING USE OF HIGH THROUGHPUT TECHNOLOGIES AS DESCRIBED BY CMS-2020-01-R: HCPCS

## 2020-07-24 PROCEDURE — 99214 PR OFFICE/OUTPT VISIT, EST, LEVL IV, 30-39 MIN: ICD-10-PCS | Mod: S$GLB,,, | Performed by: NURSE PRACTITIONER

## 2020-07-24 PROCEDURE — 99214 OFFICE O/P EST MOD 30 MIN: CPT | Mod: S$GLB,,, | Performed by: NURSE PRACTITIONER

## 2020-07-24 RX ORDER — BENZONATATE 100 MG/1
CAPSULE ORAL
Qty: 30 CAPSULE | Refills: 1 | Status: SHIPPED | OUTPATIENT
Start: 2020-07-24 | End: 2020-10-08

## 2020-07-24 NOTE — LETTER
[address] [facility] ? [place], [address] ? Phone [phone] ? Fax [phone]           Return to Work/School    Patient: Vadim Moyer  YOB: 2000   Date: 07/24/2020      To Whom It May Concern:     Vadim Moyer was in contact with/seen in my office on 07/24/2020. COVID-19 is present in our communities across the state. Not all patients are eligible or appropriate to be tested. In this situation, your employee meets the following criteria:     Vadim Moyer has met the criteria for COVID-19 testing based upon symptoms, travel, and/or potential exposure. The test has been completed and is pending results at this time. During this time the employee is not able to work and should be quarantined per the Centers for Disease Control timelines.      If you have any questions or concerns, or if I can be of further assistance, please do not hesitate to contact me.     Sincerely,      Tatiana Dockery NP

## 2020-07-24 NOTE — PROGRESS NOTES
"Subjective:       Patient ID: Vadim Moyer is a 19 y.o. male.    Vitals:  height is 5' 8" (1.727 m) and weight is 79.4 kg (175 lb). His tympanic temperature is 98.1 °F (36.7 °C). His blood pressure is 138/80 and his pulse is 96. His respiration is 16 and oxygen saturation is 98%.     Chief Complaint: Asthma    Asthma  He complains of cough. There is no shortness of breath. This is a new problem. The current episode started in the past 7 days (x3 days ago). Asthma causes daytime symptoms monthly. Asthma causes nighttime symptoms monthly. The problem has been gradually improving. The cough is dry. Pertinent negatives include no chest pain, fever, headaches, myalgias or sore throat. His symptoms are aggravated by exposure to fumes and pollen. Relieved by: nebulizer treatment. He reports moderate improvement on treatment. Risk factors for lung disease include occupational exposure. His past medical history is significant for asthma.       Constitution: Negative for chills, fatigue and fever.   HENT: Negative for congestion and sore throat.    Neck: Negative for painful lymph nodes.   Cardiovascular: Negative for chest pain and leg swelling.   Eyes: Negative for double vision and blurred vision.   Respiratory: Positive for cough and asthma. Negative for shortness of breath.    Gastrointestinal: Negative for nausea, vomiting and diarrhea.   Genitourinary: Negative for dysuria, frequency and urgency.   Musculoskeletal: Negative for joint pain, joint swelling, muscle cramps and muscle ache.   Skin: Negative for color change, pale and rash.   Allergic/Immunologic: Positive for asthma. Negative for seasonal allergies.   Neurological: Negative for dizziness, history of vertigo, light-headedness, passing out and headaches.   Hematologic/Lymphatic: Negative for swollen lymph nodes, easy bruising/bleeding and history of blood clots. Does not bruise/bleed easily.   Psychiatric/Behavioral: Negative for nervous/anxious, sleep " disturbance and depression. The patient is not nervous/anxious.        Objective:      Physical Exam   Constitutional: He is oriented to person, place, and time. He appears well-developed. He is cooperative.  Non-toxic appearance. He does not appear ill. No distress.   HENT:   Head: Normocephalic and atraumatic.   Ears:   Right Ear: Hearing, tympanic membrane, external ear and ear canal normal.   Left Ear: Hearing, tympanic membrane, external ear and ear canal normal.   Nose: Mucosal edema, rhinorrhea and congestion present. No purulent discharge or nasal deformity. No epistaxis. Right sinus exhibits maxillary sinus tenderness. Right sinus exhibits no frontal sinus tenderness. Left sinus exhibits maxillary sinus tenderness. Left sinus exhibits no frontal sinus tenderness.   Mouth/Throat: Uvula is midline, oropharynx is clear and moist and mucous membranes are normal. No trismus in the jaw. Normal dentition. No uvula swelling. No oropharyngeal exudate, posterior oropharyngeal edema or posterior oropharyngeal erythema.   Eyes: Conjunctivae and lids are normal. No scleral icterus.   Neck: Trachea normal, full passive range of motion without pain and phonation normal. Neck supple. No neck rigidity. No edema and no erythema present.   Cardiovascular: Normal rate, regular rhythm, normal heart sounds and normal pulses.   Pulmonary/Chest: Effort normal. No accessory muscle usage or stridor. No tachypnea. No respiratory distress. He has no decreased breath sounds. He has wheezes (mild) in the right upper field and the left upper field. He has no rhonchi. He has no rales.   Abdominal: Normal appearance.   Musculoskeletal: Normal range of motion.         General: No deformity.   Lymphadenopathy:     He has no cervical adenopathy.        Right cervical: No superficial cervical, no deep cervical and no posterior cervical adenopathy present.       Left cervical: No superficial cervical, no deep cervical and no posterior cervical  "adenopathy present.   Neurological: He is alert and oriented to person, place, and time. He exhibits normal muscle tone. Coordination normal.   Skin: Skin is warm, dry, intact, not diaphoretic and not pale. Psychiatric: His speech is normal and behavior is normal. Judgment and thought content normal.   Nursing note and vitals reviewed.        Assessment:       1. Viral syndrome    2. Encounter for observation for suspected exposure to other biological agents ruled out        Plan:         Viral syndrome  -     benzonatate (TESSALON PERLES) 100 MG capsule; 1-2 capsules every 8 hours as needed for cough.  Dispense: 30 capsule; Refill: 1    Encounter for observation for suspected exposure to other biological agents ruled out  -     COVID-19 Routine Screening      Patient Instructions   Please follow up with your Primary care provider within 2-5 days if your signs and symptoms have not resolved or worsen.  The usual course of cold symptoms are 10-14 days.     If your condition worsens or fails to improve we recommend that you receive another evaluation at the emergency room immediately or contact your primary medical clinic to discuss your concerns.     You must understand that you have received an Urgent Care treatment only and that you may be released before all of your medical problems are known or treated.   You, the patient, will arrange for follow up care as instructed.     Tylenol or Ibuprofen can also be used as directed for pain/fever unless you have an allergy to them or medical condition such as stomach ulcers, kidney or liver disease or blood thinners etc for which you should not be taking these type of medications.     Take over the counter cough medication as directed as needed for cough.  You should avoid medications with pseudoephedrine or phenylephrine (any medication with "D") if you have high blood pressure as this can cause an elevation in your blood pressure. Instead consider Corcidin HBP as needed " to prevent an elevated blood pressure.     Natural remedies of symptoms (as needed) include humidification, saline nasal sprays, and/or steamy showers.  Increase fluids, warm tea with honey, cough drops as needed.  You may also use salt water gargles for sore throat.    IF you received a oral steroid today - As discussed, this can elevate your blood pressure, elevate your blood sugar, water weight gain, nervous energy, redness to the face and dimpling of the skin at the injection site.       Instructions for Patients with Confirmed or Suspected COVID-19    If you are awaiting your test result, you will either be called or it will be released to the patient portal.  If you have any questions about your test, please visit [email or web address] or call our COVID-19 information line at [phone].      Instructions for non-hospitalized or discharged patients with confirmed or suspected COVID-19:       Stay home except to get medical care.    Separate yourself from other people and animals in your home.    Call ahead before visiting your doctor.    Wear a face mask.    Cover your coughs and sneezes.    Clean your hands often.    Avoid sharing personal household items.    Clean all high-touch surfaces every day.    Monitor your symptoms. Seek prompt medical attention if your illness is worsening (e.g., difficulty breathing). Before seeking care, call your healthcare provider.    If you have a medical emergency and must call 911, notify the dispatcher that you have or are being evaluated for COVID-19. If possible, put on a face mask before emergency medical services arrive.    Use the following symptom-based strategy to return to normal activity following a suspected or confirmed case of COVID-19. Continue isolation until:   o At least 3 days (72 hours) have passed since recovery defined as resolution of fever without the use of fever-reducing medications and improvement in respiratory symptoms (e.g.  cough, shortness of breath), and   o At least 10 days have passed since the first positive test.       As one of the next steps, you will receive a call or text from the [place] Department of Health ([facility]) COVID-19 Tracing Team. See the contact information below so you know not to ignore the health departments call. It is important that you contact them back immediately so they can help.     Contact Tracer Number:  [phone]  Caller ID for most carriers: [place] Dept Health    What is contact tracing?   Contact tracing is a process that helps identify everyone who has been in close contact with an infected person. Contact tracers let those people know they may have been exposed and guide them on next steps. Confidentiality is important for everyone; no one will be told who may have exposed them to the virus.   Your involvement is important. The more we know about where and how this virus is spreading, the better chance we have at stopping it from spreading further.  What does exposure mean?   Exposure means you have been within 6 feet for more than 15 minutes with a person who has or had COVID-19.  What kind of questions do the contact tracers ask?   A contact tracer will confirm your basic contact information including name, address, phone number, and next of kin, as well as asking about any symptoms you may have had. Theyll also ask you how you think you may have gotten sick, such as places where you may have been exposed to the virus, and people you were with. Those names will never be shared with anyone outside of that call, and will only be used to help trace and stop the spread of the virus.   I have privacy concerns. How will the state use my information?   Your privacy about your health is important. All calls are completed using call centers that use the appropriate health privacy protection measures (HIPAA compliance), meaning that your patient information is safe. No one will ever ask you any  questions related to immigration status. Your health comes first.   Do I have to participate?   You do not have to participate, but we strongly encourage you to. Contact tracing can help us catch and control new outbreaks as theyre developing to keep your friends and family safe.   What if I dont hear from anyone?   If you dont receive a call within 24 hours, you can call the number above right away to inquire about your status. That line is open from 8:00 am - 8:00 p.m., 7 days a week.  Contact tracing saves lives! Together, we have the power to beat this virus and keep our loved ones and neighbors safe.       Instructions for household members, intimate partners and caregivers in a non-healthcare setting of a patient with confirmed or suspected COVID-19:         Close contacts should monitor their health and call their healthcare provider right away if they develop symptoms suggestive of COVID-19 (e.g., fever, cough, shortness of breath).    Stay home except to get medical care. Separate yourself from other people and animals in the home.   Monitor the patients symptoms. If the patient is getting sicker, call his or her healthcare provider. If the patient has a medical emergency and you need to call 911, notify the dispatch personnel that the patient has or is being evaluated for COVID-19.    Wear a facemask when around other people such as sharing a room or vehicle and before entering a healthcare provider's office.   Cover coughs and sneezes with a tissue. Throw used tissues in a lined trash can immediately and wash hands.   Clean hands often with soap and water for at least 20 seconds or with an alcohol-based hand , rubbing hands together until they feel dry. Avoid touching your eyes, nose, and mouth with unwashed hands.   Clean all high-touch; surfaces every day, including counters, tabletops, doorknobs, bathroom fixtures, toilets, phones, keyboards, tablets, bedside tables, etc. Use a  household cleaning spray or wipe according to label instructions.   Avoid sharing personal household items such as dishes, drinking glasses, cups, towels, bedding, etc. After these items are used, they should be washed thoroughly with soap and water.   Continue isolation until:   At least 3 days (72 hours) have passed since recovery defined as resolution of fever without the use of fever-reducing medications and improvement in respiratory symptoms (e.g. cough, shortness of breath), and    At least 10 days have passed since the patients first positive test.    https://www.cdc.gov/coronavirus/2019-ncov/your-health/index.htm

## 2020-07-24 NOTE — PROGRESS NOTES
"Subjective:       Patient ID: Vadim Moyer is a 19 y.o. male.    Vitals:  height is 5' 8" (1.727 m) and weight is 79.4 kg (175 lb). His tympanic temperature is 98.1 °F (36.7 °C). His blood pressure is 138/80 and his pulse is 96. His respiration is 16 and oxygen saturation is 98%.     Chief Complaint: Asthma    Asthma  He complains of cough and shortness of breath. This is a new problem. The current episode started in the past 7 days. Asthma causes daytime symptoms monthly. Asthma causes nighttime symptoms monthly. The problem has been gradually improving. The cough is dry. His symptoms are aggravated by exposure to fumes and pollen. Relieved by: nebulizer treatment. He reports moderate improvement on treatment. Risk factors for lung disease include occupational exposure. His past medical history is significant for asthma.       Respiratory: Positive for cough, shortness of breath and asthma.    Allergic/Immunologic: Positive for asthma.       Objective:      Physical Exam      Assessment:       No diagnosis found.    Plan:         There are no diagnoses linked to this encounter.       "

## 2020-07-24 NOTE — PATIENT INSTRUCTIONS
"Please follow up with your Primary care provider within 2-5 days if your signs and symptoms have not resolved or worsen.  The usual course of cold symptoms are 10-14 days.     If your condition worsens or fails to improve we recommend that you receive another evaluation at the emergency room immediately or contact your primary medical clinic to discuss your concerns.     You must understand that you have received an Urgent Care treatment only and that you may be released before all of your medical problems are known or treated.   You, the patient, will arrange for follow up care as instructed.     Tylenol or Ibuprofen can also be used as directed for pain/fever unless you have an allergy to them or medical condition such as stomach ulcers, kidney or liver disease or blood thinners etc for which you should not be taking these type of medications.     Take over the counter cough medication as directed as needed for cough.  You should avoid medications with pseudoephedrine or phenylephrine (any medication with "D") if you have high blood pressure as this can cause an elevation in your blood pressure. Instead consider Corcidin HBP as needed to prevent an elevated blood pressure.     Natural remedies of symptoms (as needed) include humidification, saline nasal sprays, and/or steamy showers.  Increase fluids, warm tea with honey, cough drops as needed.  You may also use salt water gargles for sore throat.    IF you received a oral steroid today - As discussed, this can elevate your blood pressure, elevate your blood sugar, water weight gain, nervous energy, redness to the face and dimpling of the skin at the injection site.       Instructions for Patients with Confirmed or Suspected COVID-19    If you are awaiting your test result, you will either be called or it will be released to the patient portal.  If you have any questions about your test, please visit [email or web address] or call our COVID-19 information " line at [phone].      Instructions for non-hospitalized or discharged patients with confirmed or suspected COVID-19:       Stay home except to get medical care.    Separate yourself from other people and animals in your home.    Call ahead before visiting your doctor.    Wear a face mask.    Cover your coughs and sneezes.    Clean your hands often.    Avoid sharing personal household items.    Clean all high-touch surfaces every day.    Monitor your symptoms. Seek prompt medical attention if your illness is worsening (e.g., difficulty breathing). Before seeking care, call your healthcare provider.    If you have a medical emergency and must call 911, notify the dispatcher that you have or are being evaluated for COVID-19. If possible, put on a face mask before emergency medical services arrive.    Use the following symptom-based strategy to return to normal activity following a suspected or confirmed case of COVID-19. Continue isolation until:   o At least 3 days (72 hours) have passed since recovery defined as resolution of fever without the use of fever-reducing medications and improvement in respiratory symptoms (e.g. cough, shortness of breath), and   o At least 10 days have passed since the first positive test.       As one of the next steps, you will receive a call or text from the [place] Department of Health ([facility]) COVID-19 Tracing Team. See the contact information below so you know not to ignore the health departments call. It is important that you contact them back immediately so they can help.     Contact Tracer Number:  [phone]  Caller ID for most carriers: [place] Dept Health    What is contact tracing?   Contact tracing is a process that helps identify everyone who has been in close contact with an infected person. Contact tracers let those people know they may have been exposed and guide them on next steps. Confidentiality is important for everyone; no one will be told who may  have exposed them to the virus.   Your involvement is important. The more we know about where and how this virus is spreading, the better chance we have at stopping it from spreading further.  What does exposure mean?   Exposure means you have been within 6 feet for more than 15 minutes with a person who has or had COVID-19.  What kind of questions do the contact tracers ask?   A contact tracer will confirm your basic contact information including name, address, phone number, and next of kin, as well as asking about any symptoms you may have had. Theyll also ask you how you think you may have gotten sick, such as places where you may have been exposed to the virus, and people you were with. Those names will never be shared with anyone outside of that call, and will only be used to help trace and stop the spread of the virus.   I have privacy concerns. How will the state use my information?   Your privacy about your health is important. All calls are completed using call centers that use the appropriate health privacy protection measures (HIPAA compliance), meaning that your patient information is safe. No one will ever ask you any questions related to immigration status. Your health comes first.   Do I have to participate?   You do not have to participate, but we strongly encourage you to. Contact tracing can help us catch and control new outbreaks as theyre developing to keep your friends and family safe.   What if I dont hear from anyone?   If you dont receive a call within 24 hours, you can call the number above right away to inquire about your status. That line is open from 8:00 am - 8:00 p.m., 7 days a week.  Contact tracing saves lives! Together, we have the power to beat this virus and keep our loved ones and neighbors safe.       Instructions for household members, intimate partners and caregivers in a non-healthcare setting of a patient with confirmed or suspected COVID-19:         Close contacts  should monitor their health and call their healthcare provider right away if they develop symptoms suggestive of COVID-19 (e.g., fever, cough, shortness of breath).    Stay home except to get medical care. Separate yourself from other people and animals in the home.   Monitor the patients symptoms. If the patient is getting sicker, call his or her healthcare provider. If the patient has a medical emergency and you need to call 911, notify the dispatch personnel that the patient has or is being evaluated for COVID-19.    Wear a facemask when around other people such as sharing a room or vehicle and before entering a healthcare provider's office.   Cover coughs and sneezes with a tissue. Throw used tissues in a lined trash can immediately and wash hands.   Clean hands often with soap and water for at least 20 seconds or with an alcohol-based hand , rubbing hands together until they feel dry. Avoid touching your eyes, nose, and mouth with unwashed hands.   Clean all high-touch; surfaces every day, including counters, tabletops, doorknobs, bathroom fixtures, toilets, phones, keyboards, tablets, bedside tables, etc. Use a household cleaning spray or wipe according to label instructions.   Avoid sharing personal household items such as dishes, drinking glasses, cups, towels, bedding, etc. After these items are used, they should be washed thoroughly with soap and water.   Continue isolation until:   At least 3 days (72 hours) have passed since recovery defined as resolution of fever without the use of fever-reducing medications and improvement in respiratory symptoms (e.g. cough, shortness of breath), and    At least 10 days have passed since the patients first positive test.    https://www.cdc.gov/coronavirus/2019-ncov/your-health/index.htm

## 2020-07-25 LAB — SARS-COV-2 RNA RESP QL NAA+PROBE: NOT DETECTED

## 2020-09-28 ENCOUNTER — LAB VISIT (OUTPATIENT)
Dept: PRIMARY CARE CLINIC | Facility: CLINIC | Age: 20
End: 2020-09-28

## 2020-09-28 DIAGNOSIS — Z03.818 ENCOUNTER FOR OBSERVATION FOR SUSPECTED EXPOSURE TO OTHER BIOLOGICAL AGENTS RULED OUT: ICD-10-CM

## 2020-09-30 LAB — SARS-COV-2 RNA RESP QL NAA+PROBE: NEGATIVE

## 2020-10-08 ENCOUNTER — OFFICE VISIT (OUTPATIENT)
Dept: FAMILY MEDICINE | Facility: HOSPITAL | Age: 20
End: 2020-10-08
Attending: FAMILY MEDICINE
Payer: MEDICAID

## 2020-10-08 VITALS
SYSTOLIC BLOOD PRESSURE: 135 MMHG | DIASTOLIC BLOOD PRESSURE: 79 MMHG | HEART RATE: 77 BPM | WEIGHT: 164.25 LBS | BODY MASS INDEX: 24.97 KG/M2

## 2020-10-08 DIAGNOSIS — J45.909 ASTHMA, NOT WELL CONTROLLED, UNSPECIFIED ASTHMA SEVERITY, UNCOMPLICATED: ICD-10-CM

## 2020-10-08 PROCEDURE — 99213 OFFICE O/P EST LOW 20 MIN: CPT | Performed by: STUDENT IN AN ORGANIZED HEALTH CARE EDUCATION/TRAINING PROGRAM

## 2020-10-08 RX ORDER — ALBUTEROL SULFATE 90 UG/1
2 AEROSOL, METERED RESPIRATORY (INHALATION) EVERY 4 HOURS PRN
Qty: 18 G | Refills: 1 | Status: SHIPPED | OUTPATIENT
Start: 2020-10-08 | End: 2020-10-08

## 2020-10-08 RX ORDER — ALBUTEROL SULFATE 90 UG/1
2 AEROSOL, METERED RESPIRATORY (INHALATION) EVERY 4 HOURS PRN
Qty: 18 G | Refills: 1 | Status: SHIPPED | OUTPATIENT
Start: 2020-10-08 | End: 2020-11-16 | Stop reason: SDUPTHER

## 2020-10-08 RX ORDER — FLUTICASONE PROPIONATE AND SALMETEROL 50; 250 UG/1; UG/1
1 POWDER RESPIRATORY (INHALATION) 2 TIMES DAILY
Qty: 180 EACH | Refills: 3 | Status: SHIPPED | OUTPATIENT
Start: 2020-10-08 | End: 2021-10-08

## 2020-10-08 RX ORDER — BUDESONIDE AND FORMOTEROL FUMARATE DIHYDRATE 80; 4.5 UG/1; UG/1
2 AEROSOL RESPIRATORY (INHALATION) 2 TIMES DAILY
Qty: 10.2 G | Refills: 1 | Status: SHIPPED | OUTPATIENT
Start: 2020-10-08 | End: 2020-11-16 | Stop reason: SDUPTHER

## 2020-10-08 RX ORDER — FLUTICASONE PROPIONATE 50 MCG
1 SPRAY, SUSPENSION (ML) NASAL DAILY
Qty: 18.2 ML | Refills: 2 | Status: SHIPPED | OUTPATIENT
Start: 2020-10-08 | End: 2021-10-08

## 2020-10-08 RX ORDER — FLUTICASONE PROPIONATE 50 MCG
1 SPRAY, SUSPENSION (ML) NASAL DAILY
Qty: 18.2 ML | Refills: 2 | Status: SHIPPED | OUTPATIENT
Start: 2020-10-08 | End: 2020-10-08

## 2020-10-08 NOTE — PROGRESS NOTES
Subjective:       Patient ID: Vadim Moyer is a 19 y.o. male.    Chief Complaint: ER follow up    Vadim Moyer is a 19 y.o. male with history of bipolar disorder with documented history of hospitalizations and moderate persistent asthma here for ER follow up. As a child the patient has been intubated and spent time in ICU for asthma attacks. Last asthma attack was about 3-4 months ago. The patient notes compliance with asthma medications. He denies SOB, Cough or wheezing. He was seen in the ED recently for possible Covid 19 exposure, testing was negative. He was seen by a pediatric pulmonologist but has since aged out. He is up to date on vaccines. He will be going to [place] in a couple weeks     Review of Systems   Constitutional: Negative for activity change, chills, fatigue and fever.   HENT: Negative for congestion and ear pain.    Eyes: Negative for pain.   Respiratory: Negative for apnea, chest tightness, shortness of breath and wheezing.    Cardiovascular: Negative for chest pain and palpitations.   Gastrointestinal: Negative for abdominal distention, abdominal pain, constipation, diarrhea, nausea and rectal pain.   Endocrine: Negative for polydipsia.   Genitourinary: Negative for flank pain.   Musculoskeletal: Negative for arthralgias, back pain, neck pain and neck stiffness.   Skin: Negative for rash.   Neurological: Negative for dizziness, tremors, facial asymmetry, speech difficulty and light-headedness.   Psychiatric/Behavioral: Negative for agitation.       Objective:      Vitals:    10/08/20 1400   BP: 135/79   Pulse: 77     Physical Exam  Vitals signs and nursing note reviewed.   Constitutional:       General: He is awake.      Appearance: He is well-developed and well-groomed.   HENT:      Head: Normocephalic and atraumatic.      Right Ear: Tympanic membrane and external ear normal.      Left Ear: Tympanic membrane and external ear normal.      Nose: Nose normal.      Mouth/Throat:       Mouth: Mucous membranes are moist.   Eyes:      Conjunctiva/sclera: Conjunctivae normal.      Pupils: Pupils are equal, round, and reactive to light.   Neck:      Musculoskeletal: Normal range of motion and neck supple.   Cardiovascular:      Rate and Rhythm: Normal rate and regular rhythm.      Heart sounds: No murmur. No friction rub. No gallop.    Pulmonary:      Effort: Pulmonary effort is normal. No respiratory distress.      Breath sounds: Normal breath sounds. No wheezing.   Abdominal:      General: Bowel sounds are normal. There is no distension.      Palpations: Abdomen is soft.      Tenderness: There is no abdominal tenderness. There is no guarding.   Musculoskeletal: Normal range of motion.   Skin:     General: Skin is warm and dry.      Capillary Refill: Capillary refill takes less than 2 seconds.   Neurological:      General: No focal deficit present.      Mental Status: He is alert.   Psychiatric:         Behavior: Behavior is cooperative.         Assessment:       1. Asthma, not well controlled, unspecified asthma severity, uncomplicated        Plan:       Asthma, not well controlled, unspecified asthma severity, uncomplicated  -     ADVAIR DISKUS 250-50 mcg/dose diskus inhaler; Inhale 1 puff into the lungs 2 (two) times a day.  Dispense: 180 each; Refill: 3  -     Discontinue: fluticasone propionate (FLONASE) 50 mcg/actuation nasal spray; 1 spray (50 mcg total) by Each Nostril route once daily.  Dispense: 18.2 mL; Refill: 2  -     SYMBICORT 80-4.5 mcg/actuation HFAA; Inhale 2 puffs into the lungs 2 (two) times daily.  Dispense: 10.2 g; Refill: 1  -     albuterol (PROAIR HFA) 90 mcg/actuation inhaler; Inhale 2 puffs into the lungs every 4 (four) hours as needed for Wheezing or Shortness of Breath.  Dispense: 18 g; Refill: 1  -     Complete PFT with bronchodilator; Future  -     PULSE OXIMETRY WITH REST - PULM; Future  -     Ambulatory referral/consult to Pulmonology; Future; Expected date:  10/15/2020  -     fluticasone propionate (FLONASE) 50 mcg/actuation nasal spray; 1 spray (50 mcg total) by Each Nostril route once daily.  Dispense: 18.2 mL; Refill: 2      No follow-ups on file.      D'Amico C Johnson, MD  10/8/2020  1:36 PM  [facility]   House Officer -III

## 2020-10-23 ENCOUNTER — HOSPITAL ENCOUNTER (OUTPATIENT)
Dept: PULMONOLOGY | Facility: HOSPITAL | Age: 20
Discharge: HOME | End: 2020-10-23
Attending: FAMILY MEDICINE
Payer: MEDICAID

## 2020-10-23 DIAGNOSIS — J45.909 ASTHMA, NOT WELL CONTROLLED, UNSPECIFIED ASTHMA SEVERITY, UNCOMPLICATED: ICD-10-CM

## 2020-10-23 PROCEDURE — 94640 AIRWAY INHALATION TREATMENT: CPT

## 2020-10-23 PROCEDURE — 94729 DIFFUSING CAPACITY: CPT

## 2020-10-23 PROCEDURE — 94010 BREATHING CAPACITY TEST: CPT

## 2020-10-23 PROCEDURE — 94727 GAS DIL/WSHOT DETER LNG VOL: CPT

## 2020-11-16 ENCOUNTER — OFFICE VISIT (OUTPATIENT)
Dept: FAMILY MEDICINE | Facility: HOSPITAL | Age: 20
End: 2020-11-16
Attending: FAMILY MEDICINE
Payer: MEDICAID

## 2020-11-16 VITALS
DIASTOLIC BLOOD PRESSURE: 91 MMHG | HEART RATE: 80 BPM | BODY MASS INDEX: 27.81 KG/M2 | HEIGHT: 65 IN | SYSTOLIC BLOOD PRESSURE: 141 MMHG | WEIGHT: 166.88 LBS

## 2020-11-16 DIAGNOSIS — Z72.51 HIGH RISK SEXUAL BEHAVIOR, UNSPECIFIED TYPE: ICD-10-CM

## 2020-11-16 DIAGNOSIS — R30.0 DYSURIA: ICD-10-CM

## 2020-11-16 DIAGNOSIS — Z00.00 PREVENTATIVE HEALTH CARE: Primary | ICD-10-CM

## 2020-11-16 DIAGNOSIS — J45.909 ASTHMA, NOT WELL CONTROLLED, UNSPECIFIED ASTHMA SEVERITY, UNCOMPLICATED: ICD-10-CM

## 2020-11-16 PROCEDURE — 99213 OFFICE O/P EST LOW 20 MIN: CPT | Performed by: STUDENT IN AN ORGANIZED HEALTH CARE EDUCATION/TRAINING PROGRAM

## 2020-11-16 RX ORDER — BUDESONIDE AND FORMOTEROL FUMARATE DIHYDRATE 80; 4.5 UG/1; UG/1
2 AEROSOL RESPIRATORY (INHALATION) 2 TIMES DAILY
Qty: 10.2 G | Refills: 1 | Status: SHIPPED | OUTPATIENT
Start: 2020-11-16 | End: 2021-01-21 | Stop reason: SDUPTHER

## 2020-11-16 RX ORDER — ALBUTEROL SULFATE 90 UG/1
2 AEROSOL, METERED RESPIRATORY (INHALATION) EVERY 4 HOURS PRN
Qty: 18 G | Refills: 1 | OUTPATIENT
Start: 2020-11-16 | End: 2021-07-26

## 2020-11-16 NOTE — PROGRESS NOTES
"Progress Note  [facility]    Subjective:      Vadim Moyer is a 19 y.o. male with past medical history of asthma, tobacco and marijuana use here for paperwork for [employer].     Patient also admits to risky sexual behavior. Patient states he has two consistent sexual partners that he does not use any protection with. He is currently asymptomatic, but states some times after sex he has some burning. Patient denies any STD hx and also denies ever being tested.     Review of Systems   Constitutional: Negative.  Negative for chills and fever.   HENT: Negative.    Eyes: Negative.    Respiratory: Negative for cough and shortness of breath.    Cardiovascular: Negative.  Negative for chest pain and palpitations.   Gastrointestinal: Negative.  Negative for abdominal pain.   Genitourinary: Negative.    Musculoskeletal: Negative.    Skin: Negative.    Neurological: Negative.    Endo/Heme/Allergies: Negative.    Psychiatric/Behavioral: Negative.          Objective:   BP (!) 141/91   Pulse 80   Ht 5' 5" (1.651 m)   Wt 75.7 kg (166 lb 14.2 oz)   BMI 27.77 kg/m²      Physical Exam   Constitutional: He is oriented to person, place, and time and well-developed, well-nourished, and in no distress.   HENT:   Head: Normocephalic and atraumatic.   Eyes: Pupils are equal, round, and reactive to light.   Cardiovascular: Normal rate, regular rhythm, normal heart sounds and intact distal pulses.   Pulmonary/Chest: Effort normal and breath sounds normal.   Abdominal: Soft. Bowel sounds are normal.   Musculoskeletal: Normal range of motion.   Neurological: He is alert and oriented to person, place, and time.   Psychiatric: Affect normal.        Assessment:   19 y.o. with        1. Preventative health care    2. High risk sexual behavior, unspecified type    3. Dysuria         Plan:     Orders Placed This Encounter    HIV 1/2 Ag/Ab (4th Gen)    CBC Auto Differential    Urinalysis, Reflex to Urine Culture Urine, Clean Catch "    RPR     Paperwork filled out for job rc    Intermittent asthma controlled with daily inhaler and albuterol prn    Talked in depth about safe sexual practices!   Will send for STD and HIV testing, not currently symptomatic.     Pt states will get HPV vaccine next visit.     The patient's diagnosis and medications were discussed.    I will review labs and notify patient with results either by mail or contact by phone.    Cj Gardiner MD, MSc   [facility] PGY-2  11/16/2020      *This note was dictated using the M*Modal Fluency Direct word recognition program. There are word rescognition mistakes that are occasionally missed on review.

## 2021-04-13 ENCOUNTER — OFFICE VISIT (OUTPATIENT)
Dept: URGENT CARE | Facility: CLINIC | Age: 21
End: 2021-04-13
Payer: MEDICAID

## 2021-04-13 VITALS
TEMPERATURE: 98 F | WEIGHT: 160 LBS | OXYGEN SATURATION: 99 % | HEART RATE: 49 BPM | DIASTOLIC BLOOD PRESSURE: 87 MMHG | HEIGHT: 69 IN | BODY MASS INDEX: 23.7 KG/M2 | SYSTOLIC BLOOD PRESSURE: 148 MMHG | RESPIRATION RATE: 16 BRPM

## 2021-04-13 DIAGNOSIS — Z86.69 HISTORY OF MIGRAINE: ICD-10-CM

## 2021-04-13 DIAGNOSIS — R30.0 DYSURIA: ICD-10-CM

## 2021-04-13 DIAGNOSIS — R11.2 NON-INTRACTABLE VOMITING WITH NAUSEA, UNSPECIFIED VOMITING TYPE: Primary | ICD-10-CM

## 2021-04-13 DIAGNOSIS — Z20.2 ENCOUNTER FOR ASSESSMENT OF STD EXPOSURE: ICD-10-CM

## 2021-04-13 LAB
BILIRUB UR QL STRIP: POSITIVE
CTP QC/QA: YES
GLUCOSE UR QL STRIP: NEGATIVE
KETONES UR QL STRIP: NEGATIVE
LEUKOCYTE ESTERASE UR QL STRIP: POSITIVE
PH, POC UA: 5.5 (ref 5–8)
POC BLOOD, URINE: NEGATIVE
POC NITRATES, URINE: NEGATIVE
PROT UR QL STRIP: NEGATIVE
SARS-COV-2 RDRP RESP QL NAA+PROBE: NEGATIVE
SP GR UR STRIP: 1.03 (ref 1–1.03)
UROBILINOGEN UR STRIP-ACNC: POSITIVE (ref 0.3–2.2)

## 2021-04-13 PROCEDURE — 81003 URINALYSIS AUTO W/O SCOPE: CPT | Mod: QW,S$GLB,, | Performed by: PHYSICIAN ASSISTANT

## 2021-04-13 PROCEDURE — 86592 SYPHILIS TEST NON-TREP QUAL: CPT | Performed by: PHYSICIAN ASSISTANT

## 2021-04-13 PROCEDURE — 87661 TRICHOMONAS VAGINALIS AMPLIF: CPT | Performed by: PHYSICIAN ASSISTANT

## 2021-04-13 PROCEDURE — 99214 OFFICE O/P EST MOD 30 MIN: CPT | Mod: 25,S$GLB,, | Performed by: PHYSICIAN ASSISTANT

## 2021-04-13 PROCEDURE — 87086 URINE CULTURE/COLONY COUNT: CPT | Performed by: PHYSICIAN ASSISTANT

## 2021-04-13 PROCEDURE — 99214 PR OFFICE/OUTPT VISIT, EST, LEVL IV, 30-39 MIN: ICD-10-PCS | Mod: 25,S$GLB,, | Performed by: PHYSICIAN ASSISTANT

## 2021-04-13 PROCEDURE — 86703 HIV-1/HIV-2 1 RESULT ANTBDY: CPT | Performed by: PHYSICIAN ASSISTANT

## 2021-04-13 PROCEDURE — U0002: ICD-10-PCS | Mod: QW,S$GLB,, | Performed by: PHYSICIAN ASSISTANT

## 2021-04-13 PROCEDURE — 81003 POCT URINALYSIS, DIPSTICK, AUTOMATED, W/O SCOPE: ICD-10-PCS | Mod: QW,S$GLB,, | Performed by: PHYSICIAN ASSISTANT

## 2021-04-13 PROCEDURE — 80074 ACUTE HEPATITIS PANEL: CPT | Performed by: PHYSICIAN ASSISTANT

## 2021-04-13 PROCEDURE — U0002 COVID-19 LAB TEST NON-CDC: HCPCS | Mod: QW,S$GLB,, | Performed by: PHYSICIAN ASSISTANT

## 2021-04-13 RX ORDER — CEFTRIAXONE 500 MG/1
500 INJECTION, POWDER, FOR SOLUTION INTRAMUSCULAR; INTRAVENOUS
Status: COMPLETED | OUTPATIENT
Start: 2021-04-13 | End: 2021-04-13

## 2021-04-13 RX ORDER — BUTALBITAL, ACETAMINOPHEN AND CAFFEINE 50; 325; 40 MG/1; MG/1; MG/1
1 TABLET ORAL EVERY 8 HOURS PRN
Qty: 15 TABLET | Refills: 0 | Status: SHIPPED | OUTPATIENT
Start: 2021-04-13 | End: 2021-05-13

## 2021-04-13 RX ORDER — DOXYCYCLINE 100 MG/1
100 CAPSULE ORAL 2 TIMES DAILY
Qty: 14 CAPSULE | Refills: 0 | Status: SHIPPED | OUTPATIENT
Start: 2021-04-13 | End: 2021-04-20

## 2021-04-13 RX ORDER — ONDANSETRON 4 MG/1
4 TABLET, ORALLY DISINTEGRATING ORAL EVERY 12 HOURS PRN
Qty: 12 TABLET | Refills: 0 | Status: SHIPPED | OUTPATIENT
Start: 2021-04-13

## 2021-04-13 RX ADMIN — CEFTRIAXONE 500 MG: 500 INJECTION, POWDER, FOR SOLUTION INTRAMUSCULAR; INTRAVENOUS at 11:04

## 2021-04-14 LAB
HAV IGM SERPL QL IA: NEGATIVE
HBV CORE IGM SERPL QL IA: NEGATIVE
HBV SURFACE AG SERPL QL IA: NEGATIVE
HCV AB SERPL QL IA: NEGATIVE
HIV 1+2 AB+HIV1 P24 AG SERPL QL IA: NEGATIVE
RPR SER QL: NORMAL

## 2021-04-15 ENCOUNTER — TELEPHONE (OUTPATIENT)
Dept: URGENT CARE | Facility: CLINIC | Age: 21
End: 2021-04-15

## 2021-04-15 LAB — BACTERIA UR CULT: NO GROWTH

## 2021-04-16 LAB
BRPFT: ABNORMAL
DLCO ADJ PRE: 33.47 ML/(MIN*MMHG) (ref 27.53–41.39)
DLCO SINGLE BREATH LLN: 27.53
DLCO SINGLE BREATH PRE REF: 97.1 %
DLCO SINGLE BREATH REF: 34.46
DLCOC SBVA LLN: 3.76
DLCOC SBVA PRE REF: 107.4 %
DLCOC SBVA REF: 5.11
DLCOC SINGLE BREATH LLN: 27.53
DLCOC SINGLE BREATH PRE REF: 97.1 %
DLCOC SINGLE BREATH REF: 34.46
DLCOVA LLN: 3.76
DLCOVA PRE REF: 107.4 %
DLCOVA PRE: 5.49 ML/(MIN*MMHG*L) (ref 3.76–6.46)
DLCOVA REF: 5.11
DLVAADJ PRE: 5.49 ML/(MIN*MMHG*L) (ref 3.76–6.46)
ERVN2 LLN: -16448.4
ERVN2 PRE REF: 52 %
ERVN2 PRE: 0.83 L (ref -16448.4–16451.6)
ERVN2 REF: 1.6
FEF 25 75 CHG: 61.9 %
FEF 25 75 LLN: 2.5
FEF 25 75 POST REF: 45.6 %
FEF 25 75 PRE REF: 28.2 %
FEF 25 75 REF: 4.19
FET100 CHG: -24.2 %
FEV1 CHG: 28.1 %
FEV1 FVC CHG: 25.1 %
FEV1 FVC LLN: 75
FEV1 FVC POST REF: 78.4 %
FEV1 FVC PRE REF: 62.6 %
FEV1 FVC REF: 87
FEV1 LLN: 2.96
FEV1 POST REF: 72.8 %
FEV1 PRE REF: 56.9 %
FEV1 REF: 3.75
FRCN2 LLN: 2.2
FRCN2 PRE REF: 73.2 %
FRCN2 REF: 3.18
FVC CHG: 2.4 %
FVC LLN: 3.47
FVC POST REF: 92.2 %
FVC PRE REF: 90.1 %
FVC REF: 4.36
IVC PRE: 3.45 L (ref 3.47–5.26)
IVC SINGLE BREATH LLN: 3.47
IVC SINGLE BREATH PRE REF: 79.1 %
IVC SINGLE BREATH REF: 4.36
PEF CHG: 16.6 %
PEF LLN: 7.08
PEF POST REF: 58.7 %
PEF PRE REF: 50.4 %
PEF REF: 9.59
POST FEF 25 75: 1.91 L/S (ref 2.5–5.88)
POST FET 100: 4.71 SEC
POST FEV1 FVC: 67.82 % (ref 75.05–97.98)
POST FEV1: 2.73 L (ref 2.96–4.54)
POST FVC: 4.02 L (ref 3.47–5.26)
POST PEF: 5.63 L/S (ref 7.08–12.1)
PRE DLCO: 33.47 ML/(MIN*MMHG) (ref 27.53–41.39)
PRE FEF 25 75: 1.18 L/S (ref 2.5–5.88)
PRE FET 100: 6.22 SEC
PRE FEV1 FVC: 54.2 % (ref 75.05–97.98)
PRE FEV1: 2.13 L (ref 2.96–4.54)
PRE FRC N2: 2.33 L
PRE FVC: 3.93 L (ref 3.47–5.26)
PRE PEF: 4.83 L/S (ref 7.08–12.1)
RVN2 LLN: 0.91
RVN2 PRE REF: 94.6 %
RVN2 PRE: 1.5 L (ref 0.91–2.26)
RVN2 REF: 1.59
RVN2TLCN2 LLN: 14.73
RVN2TLCN2 PRE REF: 112.6 %
RVN2TLCN2 PRE: 26.69 % (ref 14.73–32.69)
RVN2TLCN2 REF: 23.71
TLCN2 LLN: 5.59
TLCN2 PRE REF: 83.3 %
TLCN2 PRE: 5.62 L (ref 5.59–7.89)
TLCN2 REF: 6.74
VA PRE: 6.1 L (ref 6.59–6.59)
VA SINGLE BREATH LLN: 6.59
VA SINGLE BREATH PRE REF: 92.5 %
VA SINGLE BREATH REF: 6.59
VCMAXN2 LLN: 3.47
VCMAXN2 PRE REF: 90.1 %
VCMAXN2 PRE: 3.93 L (ref 3.47–5.26)
VCMAXN2 REF: 4.36

## 2021-04-18 LAB
T VAGINALIS RRNA SPEC QL NAA+PROBE: NOT DETECTED
TRICHOMONAS VAGINALIS RNA, QUAL, SOURCE: NORMAL

## 2021-04-27 ENCOUNTER — TELEPHONE (OUTPATIENT)
Dept: URGENT CARE | Facility: CLINIC | Age: 21
End: 2021-04-27

## 2021-07-26 ENCOUNTER — HOSPITAL ENCOUNTER (EMERGENCY)
Facility: HOSPITAL | Age: 21
Discharge: HOME | End: 2021-07-26
Attending: EMERGENCY MEDICINE
Payer: MEDICAID

## 2021-07-26 VITALS
DIASTOLIC BLOOD PRESSURE: 85 MMHG | HEART RATE: 85 BPM | RESPIRATION RATE: 20 BRPM | SYSTOLIC BLOOD PRESSURE: 157 MMHG | OXYGEN SATURATION: 99 % | TEMPERATURE: 98 F

## 2021-07-26 DIAGNOSIS — R05.9 COUGH: ICD-10-CM

## 2021-07-26 DIAGNOSIS — Z76.0 MEDICATION REFILL: Primary | ICD-10-CM

## 2021-07-26 DIAGNOSIS — J45.901 EXACERBATION OF ASTHMA, UNSPECIFIED ASTHMA SEVERITY, UNSPECIFIED WHETHER PERSISTENT: ICD-10-CM

## 2021-07-26 LAB
CTP QC/QA: YES
SARS-COV-2 RDRP RESP QL NAA+PROBE: NEGATIVE

## 2021-07-26 PROCEDURE — 63600175 PHARM REV CODE 636 W HCPCS: Performed by: PHYSICIAN ASSISTANT

## 2021-07-26 PROCEDURE — U0002 COVID-19 LAB TEST NON-CDC: HCPCS | Performed by: NURSE PRACTITIONER

## 2021-07-26 PROCEDURE — 94640 AIRWAY INHALATION TREATMENT: CPT

## 2021-07-26 PROCEDURE — 25000242 PHARM REV CODE 250 ALT 637 W/ HCPCS: Performed by: PHYSICIAN ASSISTANT

## 2021-07-26 PROCEDURE — 99284 EMERGENCY DEPT VISIT MOD MDM: CPT | Mod: 25

## 2021-07-26 RX ORDER — ALBUTEROL SULFATE 2.5 MG/.5ML
2.5 SOLUTION RESPIRATORY (INHALATION) EVERY 4 HOURS PRN
Qty: 30 EACH | Refills: 0 | Status: SHIPPED | OUTPATIENT
Start: 2021-07-26 | End: 2022-07-26

## 2021-07-26 RX ORDER — PREDNISONE 20 MG/1
40 TABLET ORAL DAILY
Qty: 8 TABLET | Refills: 0 | Status: SHIPPED | OUTPATIENT
Start: 2021-07-26 | End: 2021-07-30

## 2021-07-26 RX ORDER — PREDNISONE 20 MG/1
40 TABLET ORAL
Status: COMPLETED | OUTPATIENT
Start: 2021-07-26 | End: 2021-07-26

## 2021-07-26 RX ORDER — ALBUTEROL SULFATE 2.5 MG/.5ML
2.5 SOLUTION RESPIRATORY (INHALATION)
Status: COMPLETED | OUTPATIENT
Start: 2021-07-26 | End: 2021-07-26

## 2021-07-26 RX ADMIN — PREDNISONE 40 MG: 20 TABLET ORAL at 07:07

## 2021-07-26 RX ADMIN — ALBUTEROL SULFATE 2.5 MG: 2.5 SOLUTION RESPIRATORY (INHALATION) at 07:07

## 2021-10-01 ENCOUNTER — OFFICE VISIT (OUTPATIENT)
Dept: URGENT CARE | Facility: CLINIC | Age: 21
End: 2021-10-01
Payer: MEDICAID

## 2021-10-01 VITALS
BODY MASS INDEX: 18.37 KG/M2 | SYSTOLIC BLOOD PRESSURE: 145 MMHG | TEMPERATURE: 98 F | OXYGEN SATURATION: 99 % | HEART RATE: 54 BPM | HEIGHT: 69 IN | WEIGHT: 124 LBS | DIASTOLIC BLOOD PRESSURE: 87 MMHG

## 2021-10-01 DIAGNOSIS — I10 ELEVATED BLOOD PRESSURE READING IN OFFICE WITH DIAGNOSIS OF HYPERTENSION: Primary | ICD-10-CM

## 2021-10-01 LAB
CTP QC/QA: YES
SARS-COV-2 RDRP RESP QL NAA+PROBE: NEGATIVE

## 2021-10-01 PROCEDURE — 99214 OFFICE O/P EST MOD 30 MIN: CPT | Mod: S$GLB,CS,, | Performed by: PHYSICIAN ASSISTANT

## 2021-10-01 PROCEDURE — U0002: ICD-10-PCS | Mod: QW,S$GLB,, | Performed by: PHYSICIAN ASSISTANT

## 2021-10-01 PROCEDURE — U0002 COVID-19 LAB TEST NON-CDC: HCPCS | Mod: QW,S$GLB,, | Performed by: PHYSICIAN ASSISTANT

## 2021-10-01 PROCEDURE — 99214 PR OFFICE/OUTPT VISIT, EST, LEVL IV, 30-39 MIN: ICD-10-PCS | Mod: S$GLB,CS,, | Performed by: PHYSICIAN ASSISTANT

## 2022-05-02 ENCOUNTER — OFFICE VISIT (OUTPATIENT)
Dept: URGENT CARE | Facility: CLINIC | Age: 22
End: 2022-05-02
Payer: MEDICAID

## 2022-05-02 VITALS
HEIGHT: 68 IN | WEIGHT: 145 LBS | OXYGEN SATURATION: 98 % | TEMPERATURE: 98 F | SYSTOLIC BLOOD PRESSURE: 135 MMHG | HEART RATE: 70 BPM | DIASTOLIC BLOOD PRESSURE: 83 MMHG | BODY MASS INDEX: 21.98 KG/M2 | RESPIRATION RATE: 18 BRPM

## 2022-05-02 DIAGNOSIS — K29.00 ACUTE GASTRITIS, PRESENCE OF BLEEDING UNSPECIFIED, UNSPECIFIED GASTRITIS TYPE: ICD-10-CM

## 2022-05-02 DIAGNOSIS — Z20.2 EXPOSURE TO STD: ICD-10-CM

## 2022-05-02 DIAGNOSIS — R11.10 VOMITING, INTRACTABILITY OF VOMITING NOT SPECIFIED, PRESENCE OF NAUSEA NOT SPECIFIED, UNSPECIFIED VOMITING TYPE: Primary | ICD-10-CM

## 2022-05-02 LAB
BILIRUB UR QL STRIP: NEGATIVE
GLUCOSE UR QL STRIP: NEGATIVE
KETONES UR QL STRIP: NEGATIVE
LEUKOCYTE ESTERASE UR QL STRIP: NEGATIVE
PH, POC UA: 5 (ref 5–8)
POC BLOOD, URINE: NEGATIVE
POC NITRATES, URINE: NEGATIVE
PROT UR QL STRIP: NEGATIVE
SP GR UR STRIP: 1.02 (ref 1–1.03)
UROBILINOGEN UR STRIP-ACNC: NORMAL (ref 0.3–2.2)

## 2022-05-02 PROCEDURE — 3075F SYST BP GE 130 - 139MM HG: CPT | Mod: CPTII,S$GLB,, | Performed by: PHYSICIAN ASSISTANT

## 2022-05-02 PROCEDURE — 99214 PR OFFICE/OUTPT VISIT, EST, LEVL IV, 30-39 MIN: ICD-10-PCS | Mod: S$GLB,,, | Performed by: PHYSICIAN ASSISTANT

## 2022-05-02 PROCEDURE — 3075F PR MOST RECENT SYSTOLIC BLOOD PRESS GE 130-139MM HG: ICD-10-PCS | Mod: CPTII,S$GLB,, | Performed by: PHYSICIAN ASSISTANT

## 2022-05-02 PROCEDURE — 81003 POCT URINALYSIS, DIPSTICK, AUTOMATED, W/O SCOPE: ICD-10-PCS | Mod: QW,S$GLB,, | Performed by: PHYSICIAN ASSISTANT

## 2022-05-02 PROCEDURE — 3079F PR MOST RECENT DIASTOLIC BLOOD PRESSURE 80-89 MM HG: ICD-10-PCS | Mod: CPTII,S$GLB,, | Performed by: PHYSICIAN ASSISTANT

## 2022-05-02 PROCEDURE — 3079F DIAST BP 80-89 MM HG: CPT | Mod: CPTII,S$GLB,, | Performed by: PHYSICIAN ASSISTANT

## 2022-05-02 PROCEDURE — 81003 URINALYSIS AUTO W/O SCOPE: CPT | Mod: QW,S$GLB,, | Performed by: PHYSICIAN ASSISTANT

## 2022-05-02 PROCEDURE — 1159F MED LIST DOCD IN RCRD: CPT | Mod: CPTII,S$GLB,, | Performed by: PHYSICIAN ASSISTANT

## 2022-05-02 PROCEDURE — 3008F PR BODY MASS INDEX (BMI) DOCUMENTED: ICD-10-PCS | Mod: CPTII,S$GLB,, | Performed by: PHYSICIAN ASSISTANT

## 2022-05-02 PROCEDURE — 3008F BODY MASS INDEX DOCD: CPT | Mod: CPTII,S$GLB,, | Performed by: PHYSICIAN ASSISTANT

## 2022-05-02 PROCEDURE — 86592 SYPHILIS TEST NON-TREP QUAL: CPT | Performed by: PHYSICIAN ASSISTANT

## 2022-05-02 PROCEDURE — S0119 ONDANSETRON 4 MG: HCPCS | Mod: S$GLB,,, | Performed by: PHYSICIAN ASSISTANT

## 2022-05-02 PROCEDURE — 87389 HIV-1 AG W/HIV-1&-2 AB AG IA: CPT | Performed by: PHYSICIAN ASSISTANT

## 2022-05-02 PROCEDURE — 1159F PR MEDICATION LIST DOCUMENTED IN MEDICAL RECORD: ICD-10-PCS | Mod: CPTII,S$GLB,, | Performed by: PHYSICIAN ASSISTANT

## 2022-05-02 PROCEDURE — S0119 PR ONDANSETRON, ORAL, 4MG: ICD-10-PCS | Mod: S$GLB,,, | Performed by: PHYSICIAN ASSISTANT

## 2022-05-02 PROCEDURE — 99214 OFFICE O/P EST MOD 30 MIN: CPT | Mod: S$GLB,,, | Performed by: PHYSICIAN ASSISTANT

## 2022-05-02 PROCEDURE — 1160F RVW MEDS BY RX/DR IN RCRD: CPT | Mod: CPTII,S$GLB,, | Performed by: PHYSICIAN ASSISTANT

## 2022-05-02 PROCEDURE — 1160F PR REVIEW ALL MEDS BY PRESCRIBER/CLIN PHARMACIST DOCUMENTED: ICD-10-PCS | Mod: CPTII,S$GLB,, | Performed by: PHYSICIAN ASSISTANT

## 2022-05-02 RX ORDER — ONDANSETRON 4 MG/1
4 TABLET, ORALLY DISINTEGRATING ORAL
Status: COMPLETED | OUTPATIENT
Start: 2022-05-02 | End: 2022-05-02

## 2022-05-02 RX ORDER — PANTOPRAZOLE SODIUM 40 MG/1
40 TABLET, DELAYED RELEASE ORAL DAILY
Qty: 30 TABLET | Refills: 0 | Status: SHIPPED | OUTPATIENT
Start: 2022-05-02 | End: 2023-05-02

## 2022-05-02 RX ORDER — ONDANSETRON 4 MG/1
4 TABLET, ORALLY DISINTEGRATING ORAL EVERY 8 HOURS PRN
Qty: 15 TABLET | Refills: 0 | Status: SHIPPED | OUTPATIENT
Start: 2022-05-02

## 2022-05-02 RX ADMIN — ONDANSETRON 4 MG: 4 TABLET, ORALLY DISINTEGRATING ORAL at 12:05

## 2022-05-02 NOTE — LETTER
May 2, 2022      [facility]  [address] [address]  Phone: [phone]  Fax: [phone]       Patient: Vadim Moyer   YOB: 2000  Date of Visit: 05/02/2022    To Whom It May Concern:    Frankie Moyer  was at [facility] on 05/02/2022. The patient may return to work/school on 5/4/2022 with no restrictions. If you have any questions or concerns, or if I can be of further assistance, please do not hesitate to contact me.    Sincerely,    Amrita Bailey PA-C

## 2022-05-02 NOTE — PATIENT INSTRUCTIONS
PLEASE READ YOUR DISCHARGE INSTRUCTIONS ENTIRELY AS IT CONTAINS IMPORTANT INFORMATION.  You were advised to to go the ER for vomiting blood.  Please reconsider if you continue to vomit up blood.  - Rest.    - Drink plenty of fluids.    - Tylenol or Ibuprofen as directed as needed for fever/pain.    - If you were prescribed antibiotics, please take them to completion.  - If you are female and on birth control pills - please use additional methods of contraception to prevent pregnancy while on antibiotics and for one cycle after.   - If you were prescribed a narcotic medication, a cough syrup, or a muscle relaxer, do not drive or operate heavy equipment or machinery while taking these medications, as they can cause drowsiness.   - If a referral to a specialty was made today, you should receive a phone call in the next few days to schedule an appt.  Please call [phone] to schedule an appt if have not gotten a phone call in the next few days.  - If you smoke, please stop smoking.  -You must understand that you've received an Urgent Care treatment only and that you may be released before all your medical problems are known or treated. You, the patient, will arrange for follow up care as instructed. Please arrange follow up with your primary medical clinic as soon as possible.   - Follow up with your PCP or specialty clinic as directed in the next 1-2 weeks if not improved or as needed.  You can call [phone] to schedule an appointment with the appropriate provider.    - Please return to Urgent Care or to the Emergency Department if your symptoms worsen.    Patient aware and verbalized understanding.

## 2022-05-02 NOTE — PROGRESS NOTES
"Subjective:       Patient ID: Vadim Moyer is a 21 y.o. male.    Vitals:  height is 5' 8" (1.727 m) and weight is 65.8 kg (145 lb). His temperature is 98.3 °F (36.8 °C). His blood pressure is 135/83 and his pulse is 70. His respiration is 18 and oxygen saturation is 98%.     Chief Complaint: Emesis, Headache, and Exposure to STD    Mr. Moyer presents for evaluation of multiple complaints.  He reports he ate calamari last night & woke up early this morning with N/V.  He reports two episodes of vomiting, both of which were dark red blood.  He denies any diarrhea, fevers, chills.  He is still nauseous.  He has not taken anything for his symptoms.    He also complains of migraine, which started last night.  The headache is temporal & does not radiate.  It has improved this morning compared to last night.  He denies any weakness, paresthesias, vision changes.      He also requests STD testing.  He reports he has had a gonorrhea, chlamydia and trichomonas test recently, but requests HIV & RPR.     Migraine   This is a new problem. The current episode started yesterday. The problem has been unchanged. The pain is located in the temporal region. The pain does not radiate. The pain quality is not similar to prior headaches. The quality of the pain is described as pulsating and throbbing. The pain is at a severity of 9/10. The pain is severe. Associated symptoms include nausea and vomiting. Pertinent negatives include no abdominal pain, blurred vision, coughing, dizziness, ear pain, fever, loss of balance, neck pain, sinus pressure or sore throat. The symptoms are aggravated by noise. He has tried nothing for the symptoms. The treatment provided no relief.   Exposure to STD  The patient's pertinent negatives include no pelvic pain, penile discharge, penile pain, scrotal swelling or testicular pain. Associated symptoms include headaches, nausea and vomiting. Pertinent negatives include no abdominal pain, chest pain, " chills, coughing, diarrhea, dysuria, fever, frequency, rash, shortness of breath, sore throat or urgency.   Emesis   This is a new problem. The current episode started today. The problem occurs 2 to 4 times per day. The problem has been resolved. Emesis appearance: dark red blood. There has been no fever. Associated symptoms include headaches. Pertinent negatives include no abdominal pain, chest pain, chills, coughing, diarrhea, dizziness or fever. Associated symptoms comments: nausea. Risk factors include suspect food intake. He has tried nothing for the symptoms. The treatment provided no relief.       Constitution: Negative for appetite change, chills, sweating, fatigue and fever.   HENT: Negative for ear pain, ear discharge, postnasal drip, sinus pain, sinus pressure and sore throat.    Neck: Negative for neck pain and neck stiffness.   Cardiovascular: Negative for chest trauma, chest pain, leg swelling, palpitations, sob on exertion and passing out.   Eyes: Negative for blurred vision.   Respiratory: Negative for cough and shortness of breath.    Gastrointestinal: Positive for nausea and vomiting. Negative for abdominal pain and diarrhea.   Genitourinary: Negative for dysuria, frequency, urgency, painful intercourse, genital sore, penile discharge, painful ejaculation, penile pain, penile swelling, scrotal swelling, testicular pain and pelvic pain.   Musculoskeletal: Negative for pain.   Skin: Negative for rash.   Neurological: Positive for headaches. Negative for dizziness, history of vertigo, light-headedness, passing out, facial drooping, speech difficulty, coordination disturbances and loss of balance.   Hematologic/Lymphatic: Negative for easy bruising/bleeding. Does not bruise/bleed easily.   Psychiatric/Behavioral: Negative for confusion.       Objective:      Physical Exam   Constitutional: He is oriented to person, place, and time. He appears well-developed. He is cooperative.  Non-toxic appearance. He  does not appear ill. No distress.   HENT:   Head: Normocephalic and atraumatic.   Ears:   Right Ear: Hearing, tympanic membrane, external ear and ear canal normal.   Left Ear: Hearing, tympanic membrane, external ear and ear canal normal.   Nose: Nose normal. No mucosal edema, rhinorrhea or nasal deformity. No epistaxis. Right sinus exhibits no maxillary sinus tenderness and no frontal sinus tenderness. Left sinus exhibits no maxillary sinus tenderness and no frontal sinus tenderness.   Mouth/Throat: Uvula is midline, oropharynx is clear and moist and mucous membranes are normal. No trismus in the jaw. Normal dentition. No uvula swelling. No posterior oropharyngeal erythema.   Eyes: Conjunctivae and lids are normal. Right eye exhibits no discharge. Left eye exhibits no discharge. No scleral icterus.   Neck: Trachea normal and phonation normal. Neck supple.   Cardiovascular: Normal rate, regular rhythm, normal heart sounds and normal pulses.   Pulmonary/Chest: Effort normal and breath sounds normal. No stridor. No respiratory distress. He has no decreased breath sounds. He has no wheezes. He has no rhonchi. He has no rales.   Abdominal: Normal appearance and bowel sounds are normal. He exhibits no distension and no mass. Soft. There is abdominal tenderness in the epigastric area. There is no rebound, no guarding, no tenderness at McBurney's point, negative Mueller's sign, no left CVA tenderness, negative Rovsing's sign, negative psoas sign, no right CVA tenderness and negative obturator sign.      Comments: Epigastric TTP.  No guarding or rebound.    Musculoskeletal: Normal range of motion.         General: No deformity. Normal range of motion.   Neurological: no focal deficit. He is alert and oriented to person, place, and time. He has normal motor skills, normal sensation and intact cranial nerves. He exhibits normal muscle tone. Gait and coordination normal. Coordination normal. GCS eye subscore is 4. GCS verbal  subscore is 5. GCS motor subscore is 6.   Skin: Skin is warm, dry, intact, not diaphoretic and not pale.   Psychiatric: His speech is normal and behavior is normal. Judgment and thought content normal.   Nursing note and vitals reviewed.        POCT Urinalysis, Dipstick, Automated, W/O Scope   Result Value Ref Range    POC Blood, Urine Negative Negative    POC Bilirubin, Urine Negative Negative    POC Urobilinogen, Urine Norm 0.3 - 2.2    POC Ketones, Urine Negative Negative    POC Protein, Urine Negative Negative    POC Nitrates, Urine Negative Negative    POC Glucose, Urine Negative Negative    pH, UA 5.0 5 - 8    POC Specific Gravity, Urine 1.025 1.003 - 1.029    POC Leukocytes, Urine Negative Negative       Assessment:       1. Vomiting, intractability of vomiting not specified, presence of nausea not specified, unspecified vomiting type    2. Exposure to STD    3. Acute gastritis, presence of bleeding unspecified, unspecified gastritis type          Plan:         Vomiting, intractability of vomiting not specified, presence of nausea not specified, unspecified vomiting type  -     POCT Urinalysis, Dipstick, Automated, W/O Scope    Exposure to STD  -     HIV 1/2 Ag/Ab (4th Gen)  -     RPR    Acute gastritis, presence of bleeding unspecified, unspecified gastritis type    Other orders  -     ondansetron disintegrating tablet 4 mg  -     ondansetron (ZOFRAN-ODT) 4 MG TbDL; Take 1 tablet (4 mg total) by mouth every 8 (eight) hours as needed (nausea).  Dispense: 15 tablet; Refill: 0  -     pantoprazole (PROTONIX) 40 MG tablet; Take 1 tablet (40 mg total) by mouth once daily.  Dispense: 30 tablet; Refill: 0    I discussed with the patient that he needs to be evaluated in the ER if he is vomiting blood.  He need further evaluation & labwork.  He declines at this time.  I discussed the risks of not having this evaluated & he verbalized understanding.   Diagnoses and plan discussed with the patient, as well as the  expected course and duration of his symptoms.  All questions and concerns were addressed prior to discharge.  He was advised to follow up with his PCP within 1 week if symptoms do not improve.  Emergency department precautions were given.  Patient verbalized understanding and was happy with the plan of care.   Note dictated with voice recognition software, please excuse any grammatical errors.    Patient Instructions   PLEASE READ YOUR DISCHARGE INSTRUCTIONS ENTIRELY AS IT CONTAINS IMPORTANT INFORMATION.  You were advised to to go the ER for vomiting blood.  Please reconsider if you continue to vomit up blood.  - Rest.    - Drink plenty of fluids.    - Tylenol or Ibuprofen as directed as needed for fever/pain.    - If you were prescribed antibiotics, please take them to completion.  - If you are female and on birth control pills - please use additional methods of contraception to prevent pregnancy while on antibiotics and for one cycle after.   - If you were prescribed a narcotic medication, a cough syrup, or a muscle relaxer, do not drive or operate heavy equipment or machinery while taking these medications, as they can cause drowsiness.   - If a referral to a specialty was made today, you should receive a phone call in the next few days to schedule an appt.  Please call [phone] to schedule an appt if have not gotten a phone call in the next few days.  - If you smoke, please stop smoking.  -You must understand that you've received an Urgent Care treatment only and that you may be released before all your medical problems are known or treated. You, the patient, will arrange for follow up care as instructed. Please arrange follow up with your primary medical clinic as soon as possible.   - Follow up with your PCP or specialty clinic as directed in the next 1-2 weeks if not improved or as needed.  You can call [phone] to schedule an appointment with the appropriate provider.    - Please return to Urgent  Care or to the Emergency Department if your symptoms worsen.    Patient aware and verbalized understanding.

## 2022-05-03 LAB — RPR SER QL: NORMAL

## 2022-05-04 ENCOUNTER — TELEPHONE (OUTPATIENT)
Dept: URGENT CARE | Facility: CLINIC | Age: 22
End: 2022-05-04
Payer: MEDICAID

## 2022-05-05 ENCOUNTER — TELEPHONE (OUTPATIENT)
Dept: URGENT CARE | Facility: CLINIC | Age: 22
End: 2022-05-05
Payer: MEDICAID

## 2022-05-05 LAB — HIV 1+2 AB+HIV1 P24 AG SERPL QL IA: NEGATIVE

## 2022-05-05 NOTE — TELEPHONE ENCOUNTER
Call patient's phone number and got mother on the phone.  I asked mother to have patient give us a call back to discuss his test results

## 2022-05-05 NOTE — TELEPHONE ENCOUNTER
Patient notified of negative RPR testing and that the HIV test is still pending. All questions answered.    on unit on unit

## 2022-05-07 ENCOUNTER — TELEPHONE (OUTPATIENT)
Dept: URGENT CARE | Facility: CLINIC | Age: 22
End: 2022-05-07
Payer: MEDICAID

## 2022-05-15 ENCOUNTER — HOSPITAL ENCOUNTER (EMERGENCY)
Facility: HOSPITAL | Age: 22
Discharge: HOME | End: 2022-05-15
Attending: EMERGENCY MEDICINE
Payer: MEDICAID

## 2022-05-15 VITALS
DIASTOLIC BLOOD PRESSURE: 83 MMHG | BODY MASS INDEX: 21.98 KG/M2 | SYSTOLIC BLOOD PRESSURE: 132 MMHG | OXYGEN SATURATION: 99 % | HEART RATE: 76 BPM | HEIGHT: 68 IN | TEMPERATURE: 99 F | RESPIRATION RATE: 18 BRPM | WEIGHT: 145 LBS

## 2022-05-15 DIAGNOSIS — R51.9 ACUTE NONINTRACTABLE HEADACHE, UNSPECIFIED HEADACHE TYPE: Primary | ICD-10-CM

## 2022-05-15 DIAGNOSIS — R53.83 FATIGUE, UNSPECIFIED TYPE: ICD-10-CM

## 2022-05-15 LAB
CTP QC/QA: YES
SARS-COV-2 RDRP RESP QL NAA+PROBE: NEGATIVE

## 2022-05-15 PROCEDURE — U0002 COVID-19 LAB TEST NON-CDC: HCPCS | Performed by: EMERGENCY MEDICINE

## 2022-05-15 PROCEDURE — 99283 EMERGENCY DEPT VISIT LOW MDM: CPT

## 2022-05-15 PROCEDURE — 25000003 PHARM REV CODE 250: Performed by: EMERGENCY MEDICINE

## 2022-05-15 RX ORDER — ACETAMINOPHEN 325 MG/1
650 TABLET ORAL
Status: COMPLETED | OUTPATIENT
Start: 2022-05-15 | End: 2022-05-15

## 2022-05-15 RX ADMIN — ACETAMINOPHEN 650 MG: 325 TABLET ORAL at 09:05

## 2022-05-15 NOTE — Clinical Note
"Vadim"James Moyer was seen and treated in our emergency department on 5/15/2022.  He may return to work on 05/15/2022.  Patient is medically clear for incarceration. He had a negative covid swab on 5/15/2022     If you have any questions or concerns, please don't hesitate to call.      Johnny Moreno MD"

## 2022-05-16 NOTE — ED PROVIDER NOTES
Encounter Date: 5/15/2022       History     Chief Complaint   Patient presents with    COVID-19 Concerns     Patient presents to the ED in [facility] custody. Patient complains of having nausea, headache, and decreased appetite. Patient voiced concerned about covid. Denies any fever, cough, or congestion.      21-year-old male brought to the emergency department by [facility] for evaluation.  Patient states that he has been having headache and decreased appetite and just not feeling well.  Concerned he may have COVID.  States it may be because I have been doing meth for the last 3 days, but I just do not feel right.  Describes generalized, throbbing headache that has been constant for the last day or so, fluctuating intensity without exacerbating alleviating factors.  Has not taken any medication for the symptoms.  Denies any congestion, sore throat, chest pain, shortness of breath nausea, vomiting, diarrhea.  No other symptoms reported at this time.         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     Asthma, not well controlled     Asthma, well controlled     Atopy     Bipolar 1 disorder     Hypertension     Overweight(278.02)     Psychiatric symptoms      Past Surgical History:   Procedure Laterality Date    HAND SURGERY      PENIS SURGERY       Family History   Problem Relation Age of Onset    Asthma Mother     Asthma Sister     Hypertension Maternal Grandmother     Coronary artery disease Maternal Grandmother     Heart attack Maternal Grandfather      Social History     Tobacco Use    Smoking status: Former Smoker     Packs/day: 0.50     Types: Cigarettes    Smokeless tobacco: Never Used   Substance Use Topics    Alcohol use: No    Drug use: No     Review of Systems   Constitutional: Positive for fatigue. Negative for chills and fever.    HENT: Negative for congestion and sore throat.    Eyes: Negative for photophobia and visual disturbance.   Respiratory: Negative for cough and shortness of breath.    Cardiovascular: Negative for chest pain and palpitations.   Gastrointestinal: Negative for abdominal pain, diarrhea and vomiting.   Musculoskeletal: Negative for back pain, neck pain and neck stiffness.   Neurological: Positive for headaches. Negative for light-headedness and numbness.       Physical Exam     Initial Vitals [05/15/22 2105]   BP Pulse Resp Temp SpO2   132/83 76 18 98.5 °F (36.9 °C) 99 %      MAP       --         Physical Exam    Nursing note and vitals reviewed.  Constitutional: He appears well-developed and well-nourished. No distress.   HENT:   Head: Normocephalic and atraumatic.   Eyes: Conjunctivae and EOM are normal. Pupils are equal, round, and reactive to light.   Neck:   Normal range of motion.  Cardiovascular: Normal rate and intact distal pulses.   Pulmonary/Chest: No respiratory distress.   Musculoskeletal:         General: No tenderness or edema. Normal range of motion.      Cervical back: Normal range of motion.     Neurological: He is alert and oriented to person, place, and time. He has normal strength. No cranial nerve deficit. GCS score is 15. GCS eye subscore is 4. GCS verbal subscore is 5. GCS motor subscore is 6.   Skin: Skin is warm and dry.         ED Course   Procedures  Labs Reviewed   SARS-COV-2 RDRP GENE          Imaging Results    None          Medications   acetaminophen tablet 650 mg (650 mg Oral Given 5/15/22 2121)     Medical Decision Making:   Initial Assessment:   21-year-old male presents emergency department complaining of headache, decreased appetite, fatigue, concerned he may have COVID  Differential Diagnosis:   COVID, methamphetamine abuse,  Clinical Tests:   Lab Tests: Reviewed       <> Summary of Lab: COVID negative  ED Management:  Patient given some Tylenol.  He is medically clear for  incarceration.  Informed him of results as well as plan to discharge with instructions on management, follow-up with primary care physician, reasons return to the emergency room. VSS                      Clinical Impression:   Final diagnoses:  [R51.9] Acute nonintractable headache, unspecified headache type (Primary)  [R53.83] Fatigue, unspecified type          ED Disposition Condition    Discharge Stable        ED Prescriptions     None        Follow-up Information     Follow up With Specialties Details Why Contact Info    Cj Gardiner MD Family Medicine Schedule an appointment as soon as possible for a visit   [facility] [facility] [address]  [phone]             Johnny Morneo MD  05/15/22 2065

## 2022-05-16 NOTE — DISCHARGE INSTRUCTIONS
I recommend taking ibuprofen 600mg every 8 hours with food and/or tylenol 650mg every 8 hours as needed for headache.

## 2023-01-20 ENCOUNTER — OFFICE VISIT (OUTPATIENT)
Dept: URGENT CARE | Facility: CLINIC | Age: 23
End: 2023-01-20
Payer: MEDICAID

## 2023-01-20 VITALS
OXYGEN SATURATION: 96 % | TEMPERATURE: 99 F | RESPIRATION RATE: 20 BRPM | DIASTOLIC BLOOD PRESSURE: 92 MMHG | SYSTOLIC BLOOD PRESSURE: 158 MMHG | WEIGHT: 157 LBS | HEIGHT: 68 IN | HEART RATE: 76 BPM | BODY MASS INDEX: 23.79 KG/M2

## 2023-01-20 DIAGNOSIS — A08.4 VIRAL GASTROENTERITIS: Primary | ICD-10-CM

## 2023-01-20 DIAGNOSIS — R19.7 NAUSEA VOMITING AND DIARRHEA: ICD-10-CM

## 2023-01-20 DIAGNOSIS — R11.2 NAUSEA VOMITING AND DIARRHEA: ICD-10-CM

## 2023-01-20 LAB
BILIRUB UR QL STRIP: NEGATIVE
GLUCOSE UR QL STRIP: NEGATIVE
KETONES UR QL STRIP: NEGATIVE
LEUKOCYTE ESTERASE UR QL STRIP: NEGATIVE
PH, POC UA: 6 (ref 5–8)
POC BLOOD, URINE: NEGATIVE
POC NITRATES, URINE: NEGATIVE
PROT UR QL STRIP: NEGATIVE
SP GR UR STRIP: 1.02 (ref 1–1.03)
UROBILINOGEN UR STRIP-ACNC: NORMAL (ref 0.3–2.2)

## 2023-01-20 PROCEDURE — 3080F PR MOST RECENT DIASTOLIC BLOOD PRESSURE >= 90 MM HG: ICD-10-PCS | Mod: CPTII,S$GLB,, | Performed by: NURSE PRACTITIONER

## 2023-01-20 PROCEDURE — 81003 POCT URINALYSIS, DIPSTICK, AUTOMATED, W/O SCOPE: ICD-10-PCS | Mod: QW,S$GLB,, | Performed by: NURSE PRACTITIONER

## 2023-01-20 PROCEDURE — 3008F PR BODY MASS INDEX (BMI) DOCUMENTED: ICD-10-PCS | Mod: CPTII,S$GLB,, | Performed by: NURSE PRACTITIONER

## 2023-01-20 PROCEDURE — 1160F PR REVIEW ALL MEDS BY PRESCRIBER/CLIN PHARMACIST DOCUMENTED: ICD-10-PCS | Mod: CPTII,S$GLB,, | Performed by: NURSE PRACTITIONER

## 2023-01-20 PROCEDURE — 99213 OFFICE O/P EST LOW 20 MIN: CPT | Mod: S$GLB,,, | Performed by: NURSE PRACTITIONER

## 2023-01-20 PROCEDURE — 99213 PR OFFICE/OUTPT VISIT, EST, LEVL III, 20-29 MIN: ICD-10-PCS | Mod: S$GLB,,, | Performed by: NURSE PRACTITIONER

## 2023-01-20 PROCEDURE — 1160F RVW MEDS BY RX/DR IN RCRD: CPT | Mod: CPTII,S$GLB,, | Performed by: NURSE PRACTITIONER

## 2023-01-20 PROCEDURE — 3008F BODY MASS INDEX DOCD: CPT | Mod: CPTII,S$GLB,, | Performed by: NURSE PRACTITIONER

## 2023-01-20 PROCEDURE — 3080F DIAST BP >= 90 MM HG: CPT | Mod: CPTII,S$GLB,, | Performed by: NURSE PRACTITIONER

## 2023-01-20 PROCEDURE — 1159F PR MEDICATION LIST DOCUMENTED IN MEDICAL RECORD: ICD-10-PCS | Mod: CPTII,S$GLB,, | Performed by: NURSE PRACTITIONER

## 2023-01-20 PROCEDURE — 3077F SYST BP >= 140 MM HG: CPT | Mod: CPTII,S$GLB,, | Performed by: NURSE PRACTITIONER

## 2023-01-20 PROCEDURE — 1159F MED LIST DOCD IN RCRD: CPT | Mod: CPTII,S$GLB,, | Performed by: NURSE PRACTITIONER

## 2023-01-20 PROCEDURE — 81003 URINALYSIS AUTO W/O SCOPE: CPT | Mod: QW,S$GLB,, | Performed by: NURSE PRACTITIONER

## 2023-01-20 PROCEDURE — 3077F PR MOST RECENT SYSTOLIC BLOOD PRESSURE >= 140 MM HG: ICD-10-PCS | Mod: CPTII,S$GLB,, | Performed by: NURSE PRACTITIONER

## 2023-01-20 RX ORDER — ONDANSETRON 4 MG/1
4 TABLET, ORALLY DISINTEGRATING ORAL EVERY 6 HOURS PRN
Qty: 10 TABLET | Refills: 0 | Status: SHIPPED | OUTPATIENT
Start: 2023-01-20

## 2023-01-20 NOTE — LETTER
[address] [place] [address]  Phone: [phone]  Fax: [phone]          Return to Work/School    Patient: Vadim Moyer  YOB: 2000   Date: 01/20/2023     To Whom It May Concern:     Vadim Moyer was in contact with/seen in my office on 01/20/2023. COVID-19 is present in our communities across the state. There is limited testing for COVID at this time, so not all patients can be tested. In this situation, your employee meets the following criteria:     Vadim Moyer has not been tested for COVID-19. He can return to work once they are asymptomatic for 24 hours without the use of fever reducing medications (Tylenol, Motrin, etc). Infection control policies of the employer should be followed, as well as good hand hygiene.     If you have any questions or concerns, or if I can be of further assistance, please do not hesitate to contact me.     Sincerely,      Nehemiah Escobar NP

## 2023-01-20 NOTE — PATIENT INSTRUCTIONS
PLEASE READ YOUR DISCHARGE INSTRUCTIONS ENTIRELY AS IT CONTAINS IMPORTANT INFORMATION.    Take the zofran for nausea (it dissolves under your tongue)     Use gatorade/pedialyte or rehydration packets to help stay hydrated. Vitamin water and plain water do not contain rehydrating electrolytes.  Increase clear liquids (water, gatorade, pedialyte, broths, jello, etc) Hold off on solids for 12-18 hours. Then advance to BRAT diet (banana, rice, applesauce, tea, toast/crackers), then advance further as tolerated. Avoid spicy or fatty foods.   Use Peptobismol to help alleviate your diarrhea symptoms.     Avoid imodium unless you have more than 6 loose stools in 24 hours.       Wash hands frequently while sick. Avoid ibuprofen or other NSAIDS until you are well.     Please go to the ER if you experience worsening pain, blood in your vomit or stool, high fever, dizziness, fainting, swelling of your abdomen, inability to pass gas or stool.           Please arrange follow up with your primary medical clinic as soon as possible. You must understand that you've received an Urgent Care treatment only and that you may be released before all of your medical problems are known or treated. You, the patient, will arrange for follow up as instructed. If your symptoms worsen or fail to improve you should go to the Emergency Room.  WE CANNOT RULE OUT ALL POSSIBLE CAUSES OF YOUR SYMPTOMS IN THE URGENT CARE SETTING PLEASE GO TO THE ER IF YOU FEELS YOUR CONDITION IS WORSENING OR YOU WOULD LIKE EMERGENT EVALUATION.

## 2023-01-20 NOTE — PROGRESS NOTES
"Subjective:       Patient ID: Vadim Moyer is a 22 y.o. male.    Vitals:  height is 5' 8" (1.727 m) and weight is 71.2 kg (157 lb). His oral temperature is 98.6 °F (37 °C). His blood pressure is 158/92 (abnormal) and his pulse is 76. His respiration is 20 and oxygen saturation is 96%.     Chief Complaint: Abdominal Pain (Vomiting, diarrhea, possible food poisoning/)    This is a 22 y.o. male who presents today with a chief complaint of nausea,  vomiting, and diarrhea that started last night . Pt explain that after eat ing dinner and ate calamari at a restaurant, he started with his symptoms, denies fever, body aches or chills, denies cough, wheezing or shortness of breath, denies abdominal pain, denies chest pain or dizziness positional lightheadedness, denies any urinary symptoms, patient reports he did at home COVID test and it was negative, denies sore throat or trouble swallowing, denies loss of taste or smell, or any other symptoms        Abdominal Pain  This is a new problem. The current episode started yesterday. The onset quality is sudden. The problem occurs constantly. The problem has been gradually worsening. The pain is located in the generalized abdominal region. The pain is at a severity of 6/10. The pain is severe. The quality of the pain is aching and cramping. Associated symptoms include diarrhea, nausea and vomiting. Pertinent negatives include no dysuria or frequency. Nothing aggravates the pain. The pain is relieved by Nothing. He has tried nothing for the symptoms. The treatment provided no relief.     Gastrointestinal:  Positive for nausea, vomiting and diarrhea. Negative for abdominal pain.   Genitourinary:  Negative for dysuria, frequency and urgency.     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 He appears well-developed.   HENT:   Head: Normocephalic and atraumatic.   Ears:   Right Ear: External ear normal.   Left Ear: External ear normal.   Nose: Nose normal.   Mouth/Throat: Uvula is midline, oropharynx is clear and moist and mucous membranes are normal.   Eyes: Conjunctivae and lids are normal.   Neck: Trachea normal. Neck supple.   Cardiovascular: Normal rate, regular rhythm and normal heart sounds.   Pulmonary/Chest: Effort normal and breath sounds normal. No respiratory distress.   Abdominal: Normal appearance and bowel sounds are normal. He exhibits no distension and no mass. Soft. There is no abdominal tenderness. There is no rebound, no guarding, no tenderness at McBurney's point, negative Mueller's sign, no left CVA tenderness, negative Rovsing's sign, negative psoas sign, no right CVA tenderness and negative obturator sign.      Comments: Abdomen soft and non tender   Musculoskeletal: Normal range of motion.         General: Normal range of motion.   Neurological: He is alert and oriented to person, place, and time. He has normal strength.   Skin: Skin is warm, dry, intact, not diaphoretic and not pale.   Psychiatric: His speech is normal and behavior is normal. Judgment and thought content normal.   Nursing note and vitals reviewed.      Results for orders placed or performed in visit on 01/20/23   POCT Urinalysis, Dipstick, Automated, W/O Scope   Result Value Ref Range    POC Blood, Urine Negative Negative    POC Bilirubin, Urine Negative Negative    POC Urobilinogen, Urine norm 0.3 - 2.2    POC Ketones, Urine Negative Negative    POC Protein, Urine Negative Negative    POC Nitrates, Urine Negative Negative    POC Glucose, Urine Negative Negative    pH, UA 6.0 5 - 8    POC Specific Gravity, Urine 1.020 1.003 - 1.029    POC Leukocytes, Urine Negative Negative         Patient in no acute  distress.  Vitals reassuring.  Discussed results/diagnosis/plan in depth with patient in clinic. Strict precautions given to patient to monitor for worsening signs and symptoms. Advised to follow up with primary.All questions answered. Strict ER precautions given. If your symptoms worsens or fail to improve you should go to the Emergency Room. Discharge and follow-up instructions given verbally/printed. Discharge and follow-up instructions discussed with the patient who expressed understanding and willingness to comply with my recommendations.Patient voiced understanding and in agreement with current treatment plan.     Please be advised this text was dictated with [employer] software and may contain errors due to translation.    Assessment:       1. Viral gastroenteritis    2. Nausea vomiting and diarrhea          Plan:         Viral gastroenteritis    Nausea vomiting and diarrhea  -     POCT Urinalysis, Dipstick, Automated, W/O Scope    Other orders  -     ondansetron (ZOFRAN-ODT) 4 MG TbDL; Take 1 tablet (4 mg total) by mouth every 6 (six) hours as needed (nausea).  Dispense: 10 tablet; Refill: 0           Medical Decision Making:   Clinical Tests:   Lab Tests: Reviewed  Urgent Care Management:  Patient in no acute distress.  Vitals reassuring.  On exam, patient is nontoxic appearing and afebrile.  Moist mucous membrane.  No signs of dehydration.  Abdomen soft and nontender.  Urinalysis as above.  Patient reported negative at home COVID test.  Proper hydration advised.  Medication prescribed and over-the-counter medications discussed.  I reiterated the importance of further evaluation if no improvement symptoms.Patient voiced understanding and in agreement with current treatment plan.           Patient Instructions   PLEASE READ YOUR DISCHARGE INSTRUCTIONS ENTIRELY AS IT CONTAINS IMPORTANT INFORMATION.    Take the zofran for nausea (it dissolves under your tongue)     Use gatorade/pedialyte or rehydration packets to  help stay hydrated. Vitamin water and plain water do not contain rehydrating electrolytes.  Increase clear liquids (water, gatorade, pedialyte, broths, jello, etc) Hold off on solids for 12-18 hours. Then advance to BRAT diet (banana, rice, applesauce, tea, toast/crackers), then advance further as tolerated. Avoid spicy or fatty foods.   Use Peptobismol to help alleviate your diarrhea symptoms.     Avoid imodium unless you have more than 6 loose stools in 24 hours.       Wash hands frequently while sick. Avoid ibuprofen or other NSAIDS until you are well.     Please go to the ER if you experience worsening pain, blood in your vomit or stool, high fever, dizziness, fainting, swelling of your abdomen, inability to pass gas or stool.           Please arrange follow up with your primary medical clinic as soon as possible. You must understand that you've received an Urgent Care treatment only and that you may be released before all of your medical problems are known or treated. You, the patient, will arrange for follow up as instructed. If your symptoms worsen or fail to improve you should go to the Emergency Room.  WE CANNOT RULE OUT ALL POSSIBLE CAUSES OF YOUR SYMPTOMS IN THE URGENT CARE SETTING PLEASE GO TO THE ER IF YOU FEELS YOUR CONDITION IS WORSENING OR YOU WOULD LIKE EMERGENT EVALUATION.

## 2023-02-03 ENCOUNTER — OFFICE VISIT (OUTPATIENT)
Dept: URGENT CARE | Facility: CLINIC | Age: 23
End: 2023-02-03
Payer: MEDICAID

## 2023-02-03 VITALS
OXYGEN SATURATION: 95 % | TEMPERATURE: 98 F | DIASTOLIC BLOOD PRESSURE: 85 MMHG | RESPIRATION RATE: 20 BRPM | BODY MASS INDEX: 23.79 KG/M2 | SYSTOLIC BLOOD PRESSURE: 139 MMHG | HEART RATE: 97 BPM | HEIGHT: 68 IN | WEIGHT: 157 LBS

## 2023-02-03 DIAGNOSIS — T78.40XA ALLERGIC REACTION, INITIAL ENCOUNTER: Primary | ICD-10-CM

## 2023-02-03 PROCEDURE — 1159F PR MEDICATION LIST DOCUMENTED IN MEDICAL RECORD: ICD-10-PCS | Mod: CPTII,S$GLB,,

## 2023-02-03 PROCEDURE — 1160F PR REVIEW ALL MEDS BY PRESCRIBER/CLIN PHARMACIST DOCUMENTED: ICD-10-PCS | Mod: CPTII,S$GLB,,

## 2023-02-03 PROCEDURE — 3075F SYST BP GE 130 - 139MM HG: CPT | Mod: CPTII,S$GLB,,

## 2023-02-03 PROCEDURE — 3008F BODY MASS INDEX DOCD: CPT | Mod: CPTII,S$GLB,,

## 2023-02-03 PROCEDURE — 1159F MED LIST DOCD IN RCRD: CPT | Mod: CPTII,S$GLB,,

## 2023-02-03 PROCEDURE — 3075F PR MOST RECENT SYSTOLIC BLOOD PRESS GE 130-139MM HG: ICD-10-PCS | Mod: CPTII,S$GLB,,

## 2023-02-03 PROCEDURE — 3079F DIAST BP 80-89 MM HG: CPT | Mod: CPTII,S$GLB,,

## 2023-02-03 PROCEDURE — 99213 PR OFFICE/OUTPT VISIT, EST, LEVL III, 20-29 MIN: ICD-10-PCS | Mod: S$GLB,,,

## 2023-02-03 PROCEDURE — 3079F PR MOST RECENT DIASTOLIC BLOOD PRESSURE 80-89 MM HG: ICD-10-PCS | Mod: CPTII,S$GLB,,

## 2023-02-03 PROCEDURE — 1160F RVW MEDS BY RX/DR IN RCRD: CPT | Mod: CPTII,S$GLB,,

## 2023-02-03 PROCEDURE — 3008F PR BODY MASS INDEX (BMI) DOCUMENTED: ICD-10-PCS | Mod: CPTII,S$GLB,,

## 2023-02-03 PROCEDURE — 99213 OFFICE O/P EST LOW 20 MIN: CPT | Mod: S$GLB,,,

## 2023-02-03 RX ORDER — PREDNISONE 10 MG/1
10 TABLET ORAL DAILY
Qty: 4 TABLET | Refills: 0 | Status: SHIPPED | OUTPATIENT
Start: 2023-02-03 | End: 2023-02-07

## 2023-02-03 NOTE — PATIENT INSTRUCTIONS
Allergic Reaction   If your condition worsens or fails to improve we recommend that you receive another evaluation at the ER immediately or contact your PCP to discuss your concerns or return here. You must understand that you've received an urgent care treatment only and that you may be released before all your medical problems are known or treated. You the patient will arrange for followup care as instructed.   Increase fluids and rest are important  Please take over the counter Pepcid as directed for the next 3-5 days as directed.  Please take Claritin or Zyrtec or Allegra (24 hours) daily for the next 3-5 days.    You can add Benadryl as needed for itching, however these may make you drowsy, so do not  drive or operate heavy equipment or machinery while taking these medications.         If you develop additional symptoms such as tongue swelling or trouble breathing go immediately to the ER.     If you were stung by an insect rarely do these get infected. However if it gets painful, increase redness or drainage you need to be rechecked either here or call your PCP.    In the future make sure to keep benadryl with you or an Epi-pen if you were prescribed one.

## 2023-02-03 NOTE — PROGRESS NOTES
"Subjective:       Patient ID: Vadim Moyer is a 22 y.o. male.    Vitals:  height is 5' 8" (1.727 m) and weight is 71.2 kg (157 lb). His oral temperature is 97.9 °F (36.6 °C). His blood pressure is 139/85 and his pulse is 97. His respiration is 20 and oxygen saturation is 95%.     Chief Complaint: Allergic Reaction (Vomiting, itching)    This is a 22 y.o. male who presents today with a chief complaint of allergic reaction that happen on today.Pt explain that he was drinking a fruit drink that cause the reaction,Office Spirometry Results:    Itching, and vomiting.      Allergic Reaction  This is a new problem. The current episode started today. The problem is unchanged. The problem is moderate. The patient was exposed to dairy/milk products, nuts and food (fruit drink). The time of exposure was just prior to onset. Associated symptoms include itching and vomiting. Past treatments include diphenhydramine. The treatment provided no relief. There is no swelling present.     Gastrointestinal:  Positive for vomiting.   Skin:  Negative for erythema.     Objective:      Physical Exam   Constitutional: He is oriented to person, place, and time. He appears well-developed.   HENT:   Head: Normocephalic and atraumatic. Head is without abrasion, without contusion and without laceration.   Ears:   Right Ear: External ear normal.   Left Ear: External ear normal.   Nose: Nose normal.   Mouth/Throat: Uvula is midline, oropharynx is clear and moist and mucous membranes are normal. No oropharyngeal exudate, posterior oropharyngeal edema, posterior oropharyngeal erythema, tonsillar abscesses or cobblestoning. Tonsils are 1+ on the right. Tonsils are 1+ on the left. No tonsillar exudate.   No Trismus, or veronica's, PT tolerating secretions, able to visualize uvula.         Comments: No Trismus, or veronica's, PT tolerating secretions, able to visualize uvula.     Eyes: Conjunctivae, EOM and lids are normal. Pupils are equal, round, and " reactive to light.   Neck: Trachea normal and phonation normal. Neck supple.   Cardiovascular: Normal rate, regular rhythm, S1 normal, S2 normal and normal heart sounds.   Pulmonary/Chest: Effort normal and breath sounds normal. No accessory muscle usage or stridor. No apnea, no tachypnea and no bradypnea. No respiratory distress. He has no decreased breath sounds. He has no wheezes. He has no rhonchi. He has no rales.   Abdominal: Bowel sounds are normal. He exhibits no shifting dullness. flat abdomen There is no abdominal tenderness. There is no rebound, no guarding, no tenderness at McBurney's point, negative Mueller's sign, no left CVA tenderness, negative Rovsing's sign, negative psoas sign, no right CVA tenderness and negative obturator sign.      Comments: Nonsurgical abdomen   Musculoskeletal: Normal range of motion.         General: Normal range of motion.   Neurological: He is alert and oriented to person, place, and time.   Skin: Skin is warm, dry, intact and rash (pink rash is diffusely over bilateral upper arms anterior and posterior with excoriations noted). Capillary refill takes less than 2 seconds. No abrasion, No burn, No bruising, No erythema and No ecchymosis        Psychiatric: His speech is normal and behavior is normal. Judgment and thought content normal.   Nursing note and vitals reviewed.      Assessment:       1. Allergic reaction, initial encounter          Plan:         Allergic reaction, initial encounter  -     predniSONE (DELTASONE) 10 MG tablet; Take 1 tablet (10 mg total) by mouth once daily. for 4 days  Dispense: 4 tablet; Refill: 0           Medical Decision Making:   Initial Assessment:   PT in room AAOX4, skin W/D, resp E/U, non toxic in appearance, NAD.    Urgent Care Management:  Vital signs stable pink rash noted to bilateral upper extremities, nonsurgical abdomen.  Patient reports rash starting after drinking smoothie gato [place] discussed with patient to not drink this  sumi a sumi [place] anymore.  Discussed with patient I am not sure what caused the allergic reaction as the ingredients that patient listed or not his allergies listed at clinic.  Discussed If condition worsens or fails to improve we recommend that you receive another evaluation at the ER immediately or contact your PCP to discuss your concerns or return here. Discussed  To increase fluids and rest are important, take over the counter Pepcid as directed for the next 3-5 days as directed by medication label.Discussed to take Claritin or Zyrtec or Allegra (24 hours) daily for the next 3-5 days and can take Benadryl as needed for itching, sedation warning ofdo not  drive or operate heavy equipment or machinery while taking these medications. Discuss to take Benadryl and Pepcid when you get home as PT is driving and clinic does not have Pepcid on site. Reviewed  If PT develops additional symptoms such as tongue swelling or trouble breathing go immediately to the ER. Discussed to take prednisone to day as prescribed .  Discussed with patient I will prescribe EpiPen but patient reports has an EpiPen at the house and declined.  Discussed with patient use EpiPen if patient develops shortness of breath or unable swallow.  PT agreed with treatment, and plan. PT verbalized understanding NAD.

## 2023-02-17 ENCOUNTER — OFFICE VISIT (OUTPATIENT)
Dept: URGENT CARE | Facility: CLINIC | Age: 23
End: 2023-02-17
Payer: MEDICAID

## 2023-02-17 VITALS
WEIGHT: 160 LBS | OXYGEN SATURATION: 97 % | RESPIRATION RATE: 20 BRPM | HEART RATE: 87 BPM | SYSTOLIC BLOOD PRESSURE: 159 MMHG | HEIGHT: 68 IN | DIASTOLIC BLOOD PRESSURE: 111 MMHG | BODY MASS INDEX: 24.25 KG/M2 | TEMPERATURE: 99 F

## 2023-02-17 DIAGNOSIS — K52.9 GASTROENTERITIS: Primary | ICD-10-CM

## 2023-02-17 DIAGNOSIS — J45.909 ASTHMA, NOT WELL CONTROLLED, UNSPECIFIED ASTHMA SEVERITY, UNCOMPLICATED: ICD-10-CM

## 2023-02-17 DIAGNOSIS — Z76.0 MEDICATION REFILL: ICD-10-CM

## 2023-02-17 DIAGNOSIS — R03.0 ELEVATED BLOOD PRESSURE READING: ICD-10-CM

## 2023-02-17 LAB
BILIRUB UR QL STRIP: NEGATIVE
CTP QC/QA: YES
GLUCOSE UR QL STRIP: NEGATIVE
KETONES UR QL STRIP: NEGATIVE
LEUKOCYTE ESTERASE UR QL STRIP: NEGATIVE
PH, POC UA: 7
POC BLOOD, URINE: NEGATIVE
POC NITRATES, URINE: NEGATIVE
PROT UR QL STRIP: NEGATIVE
SARS-COV-2 AG RESP QL IA.RAPID: NEGATIVE
SP GR UR STRIP: 1.01 (ref 1–1.03)
UROBILINOGEN UR STRIP-ACNC: NORMAL (ref 0.3–2.2)

## 2023-02-17 PROCEDURE — 3077F PR MOST RECENT SYSTOLIC BLOOD PRESSURE >= 140 MM HG: ICD-10-PCS | Mod: CPTII,S$GLB,, | Performed by: PHYSICIAN ASSISTANT

## 2023-02-17 PROCEDURE — 3080F DIAST BP >= 90 MM HG: CPT | Mod: CPTII,S$GLB,, | Performed by: PHYSICIAN ASSISTANT

## 2023-02-17 PROCEDURE — 81003 POCT URINALYSIS, DIPSTICK, AUTOMATED, W/O SCOPE: ICD-10-PCS | Mod: QW,S$GLB,, | Performed by: PHYSICIAN ASSISTANT

## 2023-02-17 PROCEDURE — 1159F MED LIST DOCD IN RCRD: CPT | Mod: CPTII,S$GLB,, | Performed by: PHYSICIAN ASSISTANT

## 2023-02-17 PROCEDURE — 87811 SARS CORONAVIRUS 2 ANTIGEN POCT, MANUAL READ: ICD-10-PCS | Mod: QW,S$GLB,, | Performed by: PHYSICIAN ASSISTANT

## 2023-02-17 PROCEDURE — 3008F PR BODY MASS INDEX (BMI) DOCUMENTED: ICD-10-PCS | Mod: CPTII,S$GLB,, | Performed by: PHYSICIAN ASSISTANT

## 2023-02-17 PROCEDURE — 1159F PR MEDICATION LIST DOCUMENTED IN MEDICAL RECORD: ICD-10-PCS | Mod: CPTII,S$GLB,, | Performed by: PHYSICIAN ASSISTANT

## 2023-02-17 PROCEDURE — 1160F PR REVIEW ALL MEDS BY PRESCRIBER/CLIN PHARMACIST DOCUMENTED: ICD-10-PCS | Mod: CPTII,S$GLB,, | Performed by: PHYSICIAN ASSISTANT

## 2023-02-17 PROCEDURE — 1160F RVW MEDS BY RX/DR IN RCRD: CPT | Mod: CPTII,S$GLB,, | Performed by: PHYSICIAN ASSISTANT

## 2023-02-17 PROCEDURE — 99214 PR OFFICE/OUTPT VISIT, EST, LEVL IV, 30-39 MIN: ICD-10-PCS | Mod: S$GLB,,, | Performed by: PHYSICIAN ASSISTANT

## 2023-02-17 PROCEDURE — 3080F PR MOST RECENT DIASTOLIC BLOOD PRESSURE >= 90 MM HG: ICD-10-PCS | Mod: CPTII,S$GLB,, | Performed by: PHYSICIAN ASSISTANT

## 2023-02-17 PROCEDURE — 81003 URINALYSIS AUTO W/O SCOPE: CPT | Mod: QW,S$GLB,, | Performed by: PHYSICIAN ASSISTANT

## 2023-02-17 PROCEDURE — 3077F SYST BP >= 140 MM HG: CPT | Mod: CPTII,S$GLB,, | Performed by: PHYSICIAN ASSISTANT

## 2023-02-17 PROCEDURE — 87811 SARS-COV-2 COVID19 W/OPTIC: CPT | Mod: QW,S$GLB,, | Performed by: PHYSICIAN ASSISTANT

## 2023-02-17 PROCEDURE — 3008F BODY MASS INDEX DOCD: CPT | Mod: CPTII,S$GLB,, | Performed by: PHYSICIAN ASSISTANT

## 2023-02-17 PROCEDURE — 99214 OFFICE O/P EST MOD 30 MIN: CPT | Mod: S$GLB,,, | Performed by: PHYSICIAN ASSISTANT

## 2023-02-17 RX ORDER — BUDESONIDE AND FORMOTEROL FUMARATE DIHYDRATE 80; 4.5 UG/1; UG/1
2 AEROSOL RESPIRATORY (INHALATION) 2 TIMES DAILY
Qty: 10.2 G | Refills: 1 | Status: SHIPPED | OUTPATIENT
Start: 2023-02-17

## 2023-02-17 RX ORDER — ONDANSETRON 4 MG/1
4 TABLET, ORALLY DISINTEGRATING ORAL EVERY 8 HOURS PRN
Qty: 12 TABLET | Refills: 0 | Status: SHIPPED | OUTPATIENT
Start: 2023-02-17

## 2023-02-17 NOTE — PATIENT INSTRUCTIONS
- Rest.    - Drink plenty of fluids.  - Pedialyte, Gatorade/powerade, water     - Tylenol or Ibuprofen as directed as needed for fever/pain.      - Take zofran (ondansetron) 4-8 mg every 8 hours as needed, as prescribed for nausea     - can take over-the-counter Pepto-Bismol to help with diarrhea.       - Follow up with your PCP or specialty clinic as directed in the next 2-3 days if not improved or as needed.  You can call [phone] to schedule an appointment with the appropriate provider.    - Go to the ER if you develop any new or worsening symptoms     - You must understand that you have received an Urgent Care treatment only and that you may be released before all of your medical problems are known or treated.   - You, the patient, will arrange for follow up care as instructed.   - If your condition worsens or fails to improve we recommend that you receive another evaluation at the ER immediately or contact your PCP to discuss your concerns or return here.       Elevated Blood Pressure  Your blood pressure was elevated during your visit to the urgent care.  It was not so high that immediate care was needed but it is recommended that you monitor your blood pressure over the next week or two to make sure that it is not staying elevated.  Please have your blood pressure taken 2-3 times daily at different times of the day.  Write all of those blood pressures down and record the time that they were taken.  Keep all that information and take it with you to see your Primary Care Physician.  If your blood pressure is consistently above 140/90 you will need to follow up with your PCP more quickly      Food Poisoning or Viral Gastroenteritis (Adult)  You have a stomach illness that is likely either food poisoning or viral gastroenteritis.  Food poisoning is illness that is passed along in food and affects the stomach and intestinal tract. It usually occurs from 1 to 24 hours after eating food that has spoiled. When  it happens within a few hours of eating, it is often caused by toxins from bacteria in food that has not been cooked or refrigerated properly.  Viral gastroenteritis is also an illness from a virus that affects the stomach and intestinal tract. Many people call it the stomach flu, but it has nothing to do with influenza. In fact, it can happen from food poisoning, but it can also happen when germs are passed from person-to-person or contaminated surface (toothbrush, cutting board, toilet) to a person.  Either illness can cause these symptoms:  Abdominal pain and cramping  Nausea  Vomiting  Diarrhea  Fever and chills  Loss of bowel control  The symptoms of food poisoning usually last 1 to 2 days. The symptoms of viral gastroenteritis can sometimes last up to 7 days but usually end sooner.  Antibiotics are not effective for either illness.  Other causes of gastroenteritis include bacteria and parasites which are not discussed here.  Home care  Follow all instructions given by your healthcare provider. Rest at home for the next 24 hours, or until you feel better. Avoid caffeine, tobacco, and alcohol. These can make diarrhea, cramping, and pain worse.  If taking medicines:  Dont take over-the-counter diarrhea or nausea medicines unless your healthcare provider tells you to.  You may use acetaminophen or NSAID medicines like ibuprofen or naproxen to reduce pain and fever. Dont use these if you have chronic liver or kidney disease, or ever had a stomach ulcer or GI bleeding. Talk with your healthcare provider first. Don't use NSAID medicines if you are already taking one for another condition (like arthritis) or are on daily aspirin therapy (such as for heart disease or after a stroke).  To prevent the spread of illness:  Remember that washing with soap and water is the best way to prevent the spread of infection. Wash your hands before and after caring for a sick person.  Clean the toilet after each use.  Wash your  hands or use alcohol-based hand  before eating.  Wash your hands or use alcohol-based hand  before and after preparing food. Keep in mind that people with diarrhea or vomiting should not prepare food for others.  Wash your hands or use alcohol-based hand  after using cutting boards, counter-tops, and knives (and other utensils) that have been in contact with raw foods.  Wash and then peel fruits and vegetables.  Keep uncooked meats away from cooked and ready-to-eat foods.  Use a food thermometer when cooking. Cook poultry to at least 165°F (74°C). Cook ground meat (beef, veal, pork, lamb) to at least 160°F (71°C). Cook fresh beef, veal, lamb, and pork to at least 145°F (63°C).  Dont eat raw or undercooked eggs (poached or haim side up), poultry, meat or unpasteurized milk and juices.  Food and drinks  The main goal while treating vomiting or diarrhea is to prevent dehydration. This is done by taking small amounts of liquids often.  Keep in mind that liquids are more important than food right now.  Drink only small amounts of liquids at a time.  Dont force yourself to eat, especially if you are having cramping, vomiting, or diarrhea. Dont eat large amounts at a time, even if you are hungry.  If you eat, avoid fatty, greasy, spicy, or fried foods.  Dont eat dairy foods or drink milk if you have diarrhea. These can make diarrhea worse.  The first 24 hours you can try:  Oral rehydration solutions, available at grocery stores and pharmacies. Sports drinks are not a good choice if you are very dehydrated. They have too much sugar and not enough electrolytes.  Soft drinks without caffeine  Ginger ale  Water (plain or flavored)  Decaf tea or coffee  Clear broth, consommé, or bouillon  Gelatin, popsicles, or frozen fruit juice bars   The second 24 hours, if you are feeling better, you can add:  Hot cereal, plain toast, bread, rolls, or crackers  Plain noodles, rice, mashed potatoes, chicken  noodle soup, or rice soup  Unsweetened canned fruit (no pineapple)  Bananas  As you recover:  Limit fat intake to less than 15 grams per day. Dont eat margarine, butter, oils, mayonnaise, sauces, gravies, fried foods, peanut butter, meat, poultry, or fish.  Limit fiber. Dont eat raw or cooked vegetables, fresh fruits except bananas, and bran cereals.  Limit caffeine and chocolate.  Dont use spices or seasonings except salt.  Resume a normal diet over time, as you feel better and your symptoms improve.  If the symptoms come back, go back to a simple diet or clear liquids.  Follow-up care  Follow up with your healthcare provider, or as advised. If a stool sample was taken or cultures were done, call the healthcare provider for the results as instructed.  Call 911  Call 911 if you have any of these symptoms:  Trouble breathing  Confusion  Extreme drowsiness or trouble walking  Loss of consciousness  Rapid heart rate  Chest pain  Stiff neck  Seizure  When to seek medical advice  Call your healthcare provider right away if any of these occur:  Abdominal pain that gets worse  Constant lower right abdominal pain  Continued vomiting and inability to keep liquids down  Diarrhea more than 5 times a day  Blood in vomit or stool  Dark urine or no urine for 8 hours, dry mouth and tongue, tiredness, weakness, or dizziness  New rash  You dont get better in 2 to 3 days  Fever of 100.4°F (38°C) or higher that doesnt get lower with medicine  You have new symptoms of arthritis  Date Last Reviewed: 1/3/2016  © 0479-5742 [employer]. [address] [place] [address], [place] [address]. All rights reserved. This information is not intended as a substitute for professional medical care. Always follow your healthcare professional's instructions.         Diet for Vomiting or Diarrhea (Adult)    Your symptoms may return or get worse after eating certain foods listed below. If this happens, stop eating these foods until your symptoms  ease and you feel better.  Once the vomiting stops, follow the steps below.   During the first 12 to 24 hours  During the first 12 to 24 hours, follow this diet:  Drinks. Plain water, sport drinks like electrolyte solutions, soft drinks without caffeine, mineral water (plain or flavored), clear fruit juices, and decaffeinated tea and coffee.  Soups. Clear broth.  Desserts. Plain gelatin, popsicles, and fruit juice bars. As you feel better, you may add 6 to 8 ounces of yogurt per day. If you have diarrhea, don't have foods or drinks that contain sugar, high-fructose corn syrup, or sugar alcohols.  During the next 24 hours  During the next 24 hours you may add the following to the above:  Hot cereal, plain toast, bread, rolls, and crackers  Plain noodles, rice, mashed potatoes, and chicken noodle or rice soup  Unsweetened canned fruit (but not pineapple) and bananas  Don't eat more than 15 grams of fat a day. Do this by staying away from margarine, butter, oils, mayonnaise, sauces, gravies, fried foods, peanut butter, meat, poultry, and fish.  Don't eat much fiber. Stay away from raw or cooked vegetables, fresh fruits (except bananas), and bran cereals.  Limit how much caffeine and chocolate you have. Do not use any spices or seasonings except salt.  During the next 24 hours  Slowly go back to your normal diet, as you feel better and your symptoms ease.  Date Last Reviewed: 8/1/2016 © 2000-2017 [employer]. [address] [place] [address], [place] [address]. All rights reserved. This information is not intended as a substitute for professional medical care. Always follow your healthcare professional's instructions.

## 2023-02-17 NOTE — PROGRESS NOTES
"Subjective:       Patient ID: Vadim Moyer is a 22 y.o. male.    Vitals:  height is 5' 8" (1.727 m) and weight is 72.6 kg (160 lb). His oral temperature is 98.6 °F (37 °C). His blood pressure is 159/111 (abnormal) and his pulse is 87. His respiration is 20 and oxygen saturation is 97%.     Chief Complaint: Diarrhea    This is a 22 y.o. male   Who presents with complaint of nausea, vomiting, diarrhea that began last night.  Patient states that it began after eating Chinese food.  No recent travel, no recent antibiotic use.  No history of GI disease.  No abdominal pain or urinary symptoms.  No fever.  Patient states that he had about 4 episodes of vomiting and several episodes of watery diarrhea all throughout the night        Constitution: Positive for appetite change. Negative for chills, sweating, fatigue and fever.   HENT:  Negative for ear pain, congestion and sore throat.    Neck: Negative for neck pain and neck stiffness.   Cardiovascular:  Negative for chest pain, leg swelling, palpitations and sob on exertion.   Eyes:  Negative for eye itching, eye pain and eye redness.   Respiratory:  Positive for asthma. Negative for cough, sputum production and shortness of breath.    Gastrointestinal:  Positive for nausea, vomiting and diarrhea. Negative for abdominal trauma, abdominal pain, abdominal bloating, history of abdominal surgery, constipation, bright red blood in stool, dark colored stools, rectal bleeding, rectal pain, hemorrhoids, heartburn and bowel incontinence.   Genitourinary:  Negative for dysuria, frequency, urgency, flank pain and hematuria.   Musculoskeletal:  Negative for pain and joint pain.   Skin:  Negative for color change and rash.   Allergic/Immunologic: Positive for asthma.   Neurological:  Negative for dizziness, passing out, headaches, disorientation and numbness.   Psychiatric/Behavioral:  Negative for disorientation.      Objective:      Physical Exam   Constitutional: He is oriented " to person, place, and time. He appears well-developed.  Non-toxic appearance. He does not appear ill. No distress.   HENT:   Head: Normocephalic and atraumatic.   Ears:   Right Ear: External ear normal.   Left Ear: External ear normal.   Nose: Nose normal.   Mouth/Throat: Mucous membranes are normal.   Eyes: Conjunctivae and lids are normal.   Neck: Trachea normal. Neck supple.   Cardiovascular: Normal rate, regular rhythm and normal heart sounds.   Pulmonary/Chest: Effort normal and breath sounds normal. No accessory muscle usage or stridor. No tachypnea and no bradypnea. No respiratory distress. He has no decreased breath sounds. He has no wheezes. He has no rhonchi. He has no rales.   Abdominal: Normal appearance and bowel sounds are normal. He exhibits no distension and no mass. Soft. There is no abdominal tenderness. There is no guarding.      Comments:   Abdomen soft nontender to palpation without rigidity or guarding.   Musculoskeletal: Normal range of motion.         General: Normal range of motion.   Neurological: He is alert and oriented to person, place, and time. He has normal strength.   Skin: Skin is warm, dry, intact, not diaphoretic and not pale.   Psychiatric: His speech is normal and behavior is normal. Judgment and thought content normal.   Nursing note and vitals reviewed.        Results for orders placed or performed in visit on 02/17/23   POCT Urinalysis, Dipstick, Automated, W/O Scope   Result Value Ref Range    POC Blood, Urine Negative Negative    POC Bilirubin, Urine Negative Negative    POC Urobilinogen, Urine normal 0.3 - 2.2    POC Ketones, Urine Negative Negative    POC Protein, Urine Negative Negative    POC Nitrates, Urine Negative Negative    POC Glucose, Urine Negative Negative    pH, UA 7.0     POC Specific Gravity, Urine 1.015 1.003 - 1.029    POC Leukocytes, Urine Negative Negative   SARS Coronavirus 2 Antigen, POCT Manual Read   Result Value Ref Range    SARS Coronavirus 2  Antigen Negative Negative     Acceptable Yes        Assessment:       1. Gastroenteritis    2. Elevated blood pressure reading    3. Asthma, not well controlled, unspecified asthma severity, uncomplicated    4. Medication refill          Plan:         Referral placed to [facility] Medicine to establish care with PCP for elevated blood pressure and for asthma.  Patient is requesting refill of his Symbicort today.  He has not seen his PCP in a while, states that he has been in and out of [facility], so this is the reason..  Instructed to monitor BP, follow-up with PCP  - Discussed ddx, home care, tx options, and given follow up precautions.  I have reviewed the patient's chart to view previous visits, labs, and imaging to assess PMH and look for any trends or previous treatments.    Gastroenteritis  -     POCT Urinalysis, Dipstick, Automated, W/O Scope  -     SARS Coronavirus 2 Antigen, POCT Manual Read  -     ondansetron (ZOFRAN-ODT) 4 MG TbDL; Take 1 tablet (4 mg total) by mouth every 8 (eight) hours as needed (nausea).  Dispense: 12 tablet; Refill: 0    Elevated blood pressure reading  -     Ambulatory referral/consult to [facility]    Asthma, not well controlled, unspecified asthma severity, uncomplicated  -     SYMBICORT 80-4.5 mcg/actuation HFAA; Inhale 2 puffs into the lungs 2 (two) times daily.  Dispense: 10.2 g; Refill: 1    Medication refill  -     SYMBICORT 80-4.5 mcg/actuation HFAA; Inhale 2 puffs into the lungs 2 (two) times daily.  Dispense: 10.2 g; Refill: 1         Patient Instructions   - Rest.    - Drink plenty of fluids.  - Pedialyte, Gatorade/powerade, water     - Tylenol or Ibuprofen as directed as needed for fever/pain.      - Take zofran (ondansetron) 4-8 mg every 8 hours as needed, as prescribed for nausea     - can take over-the-counter Pepto-Bismol to help with diarrhea.       - Follow up with your PCP or specialty clinic as directed in the next 2-3 days if not improved or as  needed.  You can call [phone] to schedule an appointment with the appropriate provider.    - Go to the ER if you develop any new or worsening symptoms     - You must understand that you have received an Urgent Care treatment only and that you may be released before all of your medical problems are known or treated.   - You, the patient, will arrange for follow up care as instructed.   - If your condition worsens or fails to improve we recommend that you receive another evaluation at the ER immediately or contact your PCP to discuss your concerns or return here.       Elevated Blood Pressure  Your blood pressure was elevated during your visit to the urgent care.  It was not so high that immediate care was needed but it is recommended that you monitor your blood pressure over the next week or two to make sure that it is not staying elevated.  Please have your blood pressure taken 2-3 times daily at different times of the day.  Write all of those blood pressures down and record the time that they were taken.  Keep all that information and take it with you to see your Primary Care Physician.  If your blood pressure is consistently above 140/90 you will need to follow up with your PCP more quickly      Food Poisoning or Viral Gastroenteritis (Adult)  You have a stomach illness that is likely either food poisoning or viral gastroenteritis.  Food poisoning is illness that is passed along in food and affects the stomach and intestinal tract. It usually occurs from 1 to 24 hours after eating food that has spoiled. When it happens within a few hours of eating, it is often caused by toxins from bacteria in food that has not been cooked or refrigerated properly.  Viral gastroenteritis is also an illness from a virus that affects the stomach and intestinal tract. Many people call it the stomach flu, but it has nothing to do with influenza. In fact, it can happen from food poisoning, but it can also happen when germs are  passed from person-to-person or contaminated surface (toothbrush, cutting board, toilet) to a person.  Either illness can cause these symptoms:  Abdominal pain and cramping  Nausea  Vomiting  Diarrhea  Fever and chills  Loss of bowel control  The symptoms of food poisoning usually last 1 to 2 days. The symptoms of viral gastroenteritis can sometimes last up to 7 days but usually end sooner.  Antibiotics are not effective for either illness.  Other causes of gastroenteritis include bacteria and parasites which are not discussed here.  Home care  Follow all instructions given by your healthcare provider. Rest at home for the next 24 hours, or until you feel better. Avoid caffeine, tobacco, and alcohol. These can make diarrhea, cramping, and pain worse.  If taking medicines:  Dont take over-the-counter diarrhea or nausea medicines unless your healthcare provider tells you to.  You may use acetaminophen or NSAID medicines like ibuprofen or naproxen to reduce pain and fever. Dont use these if you have chronic liver or kidney disease, or ever had a stomach ulcer or GI bleeding. Talk with your healthcare provider first. Don't use NSAID medicines if you are already taking one for another condition (like arthritis) or are on daily aspirin therapy (such as for heart disease or after a stroke).  To prevent the spread of illness:  Remember that washing with soap and water is the best way to prevent the spread of infection. Wash your hands before and after caring for a sick person.  Clean the toilet after each use.  Wash your hands or use alcohol-based hand  before eating.  Wash your hands or use alcohol-based hand  before and after preparing food. Keep in mind that people with diarrhea or vomiting should not prepare food for others.  Wash your hands or use alcohol-based hand  after using cutting boards, counter-tops, and knives (and other utensils) that have been in contact with raw foods.  Wash and  then peel fruits and vegetables.  Keep uncooked meats away from cooked and ready-to-eat foods.  Use a food thermometer when cooking. Cook poultry to at least 165°F (74°C). Cook ground meat (beef, veal, pork, lamb) to at least 160°F (71°C). Cook fresh beef, veal, lamb, and pork to at least 145°F (63°C).  Dont eat raw or undercooked eggs (poached or haim side up), poultry, meat or unpasteurized milk and juices.  Food and drinks  The main goal while treating vomiting or diarrhea is to prevent dehydration. This is done by taking small amounts of liquids often.  Keep in mind that liquids are more important than food right now.  Drink only small amounts of liquids at a time.  Dont force yourself to eat, especially if you are having cramping, vomiting, or diarrhea. Dont eat large amounts at a time, even if you are hungry.  If you eat, avoid fatty, greasy, spicy, or fried foods.  Dont eat dairy foods or drink milk if you have diarrhea. These can make diarrhea worse.  The first 24 hours you can try:  Oral rehydration solutions, available at grocery stores and pharmacies. Sports drinks are not a good choice if you are very dehydrated. They have too much sugar and not enough electrolytes.  Soft drinks without caffeine  Ginger ale  Water (plain or flavored)  Decaf tea or coffee  Clear broth, consommé, or bouillon  Gelatin, popsicles, or frozen fruit juice bars   The second 24 hours, if you are feeling better, you can add:  Hot cereal, plain toast, bread, rolls, or crackers  Plain noodles, rice, mashed potatoes, chicken noodle soup, or rice soup  Unsweetened canned fruit (no pineapple)  Bananas  As you recover:  Limit fat intake to less than 15 grams per day. Dont eat margarine, butter, oils, mayonnaise, sauces, gravies, fried foods, peanut butter, meat, poultry, or fish.  Limit fiber. Dont eat raw or cooked vegetables, fresh fruits except bananas, and bran cereals.  Limit caffeine and chocolate.  Dont use spices or  seasonings except salt.  Resume a normal diet over time, as you feel better and your symptoms improve.  If the symptoms come back, go back to a simple diet or clear liquids.  Follow-up care  Follow up with your healthcare provider, or as advised. If a stool sample was taken or cultures were done, call the healthcare provider for the results as instructed.  Call 911  Call 911 if you have any of these symptoms:  Trouble breathing  Confusion  Extreme drowsiness or trouble walking  Loss of consciousness  Rapid heart rate  Chest pain  Stiff neck  Seizure  When to seek medical advice  Call your healthcare provider right away if any of these occur:  Abdominal pain that gets worse  Constant lower right abdominal pain  Continued vomiting and inability to keep liquids down  Diarrhea more than 5 times a day  Blood in vomit or stool  Dark urine or no urine for 8 hours, dry mouth and tongue, tiredness, weakness, or dizziness  New rash  You dont get better in 2 to 3 days  Fever of 100.4°F (38°C) or higher that doesnt get lower with medicine  You have new symptoms of arthritis  Date Last Reviewed: 1/3/2016  © 9169-2386 [employer]. [address] [place] [address]. All rights reserved. This information is not intended as a substitute for professional medical care. Always follow your healthcare professional's instructions.         Diet for Vomiting or Diarrhea (Adult)    Your symptoms may return or get worse after eating certain foods listed below. If this happens, stop eating these foods until your symptoms ease and you feel better.  Once the vomiting stops, follow the steps below.   During the first 12 to 24 hours  During the first 12 to 24 hours, follow this diet:  Drinks. Plain water, sport drinks like electrolyte solutions, soft drinks without caffeine, mineral water (plain or flavored), clear fruit juices, and decaffeinated tea and coffee.  Soups. Clear broth.  Desserts. Plain gelatin, popsicles, and  fruit juice bars. As you feel better, you may add 6 to 8 ounces of yogurt per day. If you have diarrhea, don't have foods or drinks that contain sugar, high-fructose corn syrup, or sugar alcohols.  During the next 24 hours  During the next 24 hours you may add the following to the above:  Hot cereal, plain toast, bread, rolls, and crackers  Plain noodles, rice, mashed potatoes, and chicken noodle or rice soup  Unsweetened canned fruit (but not pineapple) and bananas  Don't eat more than 15 grams of fat a day. Do this by staying away from margarine, butter, oils, mayonnaise, sauces, gravies, fried foods, peanut butter, meat, poultry, and fish.  Don't eat much fiber. Stay away from raw or cooked vegetables, fresh fruits (except bananas), and bran cereals.  Limit how much caffeine and chocolate you have. Do not use any spices or seasonings except salt.  During the next 24 hours  Slowly go back to your normal diet, as you feel better and your symptoms ease.  Date Last Reviewed: 8/1/2016  © 8105-9023 [employer]. [address] [place] [address], [place] [address]. All rights reserved. This information is not intended as a substitute for professional medical care. Always follow your healthcare professional's instructions.

## 2023-02-17 NOTE — LETTER
February 17, 2023      [facility]  [address] [id number]  Phone: [phone]  Fax: [phone]       Patient: Vadim Moyer   YOB: 2000  Date of Visit: 02/17/2023    To Whom It May Concern:    Frankie Moyer  was at [facility] on 02/17/2023. The patient may return to work/school on 2/19/2023 with no restrictions. If you have any questions or concerns, or if I can be of further assistance, please do not hesitate to contact me.    Sincerely,    Saad Rae PA-C

## 2023-03-05 ENCOUNTER — HOSPITAL ENCOUNTER (EMERGENCY)
Facility: HOSPITAL | Age: 23
Discharge: PSYCHIATRIC OR SUBSTANCE USE FACILITY | End: 2023-03-06
Attending: STUDENT IN AN ORGANIZED HEALTH CARE EDUCATION/TRAINING PROGRAM
Payer: MEDICAID

## 2023-03-05 DIAGNOSIS — R46.89 AGGRESSIVE BEHAVIOR: Primary | ICD-10-CM

## 2023-03-05 LAB
ALBUMIN SERPL BCP-MCNC: 4.2 G/DL (ref 3.5–5.2)
ALP SERPL-CCNC: 122 U/L (ref 55–135)
ALT SERPL W/O P-5'-P-CCNC: 12 U/L (ref 10–44)
AMPHET+METHAMPHET UR QL: ABNORMAL
ANION GAP SERPL CALC-SCNC: 8 MMOL/L (ref 8–16)
APAP SERPL-MCNC: <3 UG/ML (ref 10–20)
AST SERPL-CCNC: 21 U/L (ref 10–40)
BACTERIA #/AREA URNS AUTO: ABNORMAL /HPF
BARBITURATES UR QL SCN>200 NG/ML: NEGATIVE
BASOPHILS # BLD AUTO: 0.03 K/UL (ref 0–0.2)
BASOPHILS NFR BLD: 0.5 % (ref 0–1.9)
BENZODIAZ UR QL SCN>200 NG/ML: NEGATIVE
BILIRUB SERPL-MCNC: 0.6 MG/DL (ref 0.1–1)
BILIRUB UR QL STRIP: NEGATIVE
BUN SERPL-MCNC: 10 MG/DL (ref 6–20)
BZE UR QL SCN: NEGATIVE
CALCIUM SERPL-MCNC: 9.7 MG/DL (ref 8.7–10.5)
CANNABINOIDS UR QL SCN: ABNORMAL
CHLORIDE SERPL-SCNC: 105 MMOL/L (ref 95–110)
CLARITY UR REFRACT.AUTO: CLEAR
CO2 SERPL-SCNC: 25 MMOL/L (ref 23–29)
COLOR UR AUTO: YELLOW
CREAT SERPL-MCNC: 0.9 MG/DL (ref 0.5–1.4)
CREAT UR-MCNC: 363 MG/DL (ref 23–375)
CTP QC/QA: YES
DIFFERENTIAL METHOD: ABNORMAL
EOSINOPHIL # BLD AUTO: 0.1 K/UL (ref 0–0.5)
EOSINOPHIL NFR BLD: 1.6 % (ref 0–8)
ERYTHROCYTE [DISTWIDTH] IN BLOOD BY AUTOMATED COUNT: 12 % (ref 11.5–14.5)
EST. GFR  (NO RACE VARIABLE): >60 ML/MIN/1.73 M^2
ETHANOL SERPL-MCNC: <10 MG/DL
GLUCOSE SERPL-MCNC: 101 MG/DL (ref 70–110)
GLUCOSE UR QL STRIP: NEGATIVE
HCT VFR BLD AUTO: 50.2 % (ref 40–54)
HCV AB SERPL QL IA: NORMAL
HGB BLD-MCNC: 16.3 G/DL (ref 14–18)
HGB UR QL STRIP: NEGATIVE
HIV 1+2 AB+HIV1 P24 AG SERPL QL IA: NORMAL
IMM GRANULOCYTES # BLD AUTO: 0.01 K/UL (ref 0–0.04)
IMM GRANULOCYTES NFR BLD AUTO: 0.2 % (ref 0–0.5)
KETONES UR QL STRIP: ABNORMAL
LEUKOCYTE ESTERASE UR QL STRIP: ABNORMAL
LYMPHOCYTES # BLD AUTO: 1.6 K/UL (ref 1–4.8)
LYMPHOCYTES NFR BLD: 26.6 % (ref 18–48)
MCH RBC QN AUTO: 29.4 PG (ref 27–31)
MCHC RBC AUTO-ENTMCNC: 32.5 G/DL (ref 32–36)
MCV RBC AUTO: 91 FL (ref 82–98)
METHADONE UR QL SCN>300 NG/ML: NEGATIVE
MICROSCOPIC COMMENT: ABNORMAL
MONOCYTES # BLD AUTO: 0.3 K/UL (ref 0.3–1)
MONOCYTES NFR BLD: 5.4 % (ref 4–15)
NEUTROPHILS # BLD AUTO: 4.1 K/UL (ref 1.8–7.7)
NEUTROPHILS NFR BLD: 65.7 % (ref 38–73)
NITRITE UR QL STRIP: NEGATIVE
NON-SQ EPI CELLS #/AREA URNS AUTO: 0 /HPF
NRBC BLD-RTO: 0 /100 WBC
OPIATES UR QL SCN: NEGATIVE
PCP UR QL SCN>25 NG/ML: NEGATIVE
PH UR STRIP: 6 [PH] (ref 5–8)
PLATELET # BLD AUTO: 231 K/UL (ref 150–450)
PMV BLD AUTO: 8.7 FL (ref 9.2–12.9)
POTASSIUM SERPL-SCNC: 3.9 MMOL/L (ref 3.5–5.1)
PROT SERPL-MCNC: 7.4 G/DL (ref 6–8.4)
PROT UR QL STRIP: ABNORMAL
RBC # BLD AUTO: 5.55 M/UL (ref 4.6–6.2)
RBC #/AREA URNS AUTO: 1 /HPF (ref 0–4)
SARS-COV-2 RDRP RESP QL NAA+PROBE: NEGATIVE
SODIUM SERPL-SCNC: 138 MMOL/L (ref 136–145)
SP GR UR STRIP: 1.03 (ref 1–1.03)
SQUAMOUS #/AREA URNS AUTO: 1 /HPF
TOXICOLOGY INFORMATION: ABNORMAL
TSH SERPL DL<=0.005 MIU/L-ACNC: 0.43 UIU/ML (ref 0.4–4)
URN SPEC COLLECT METH UR: ABNORMAL
WBC # BLD AUTO: 6.16 K/UL (ref 3.9–12.7)
WBC #/AREA URNS AUTO: 21 /HPF (ref 0–5)
WBC CLUMPS UR QL AUTO: ABNORMAL

## 2023-03-05 PROCEDURE — 87086 URINE CULTURE/COLONY COUNT: CPT | Performed by: STUDENT IN AN ORGANIZED HEALTH CARE EDUCATION/TRAINING PROGRAM

## 2023-03-05 PROCEDURE — 99285 EMERGENCY DEPT VISIT HI MDM: CPT

## 2023-03-05 PROCEDURE — 25000003 PHARM REV CODE 250: Performed by: STUDENT IN AN ORGANIZED HEALTH CARE EDUCATION/TRAINING PROGRAM

## 2023-03-05 PROCEDURE — 81001 URINALYSIS AUTO W/SCOPE: CPT | Performed by: STUDENT IN AN ORGANIZED HEALTH CARE EDUCATION/TRAINING PROGRAM

## 2023-03-05 PROCEDURE — 80053 COMPREHEN METABOLIC PANEL: CPT | Performed by: STUDENT IN AN ORGANIZED HEALTH CARE EDUCATION/TRAINING PROGRAM

## 2023-03-05 PROCEDURE — 85025 COMPLETE CBC W/AUTO DIFF WBC: CPT | Performed by: STUDENT IN AN ORGANIZED HEALTH CARE EDUCATION/TRAINING PROGRAM

## 2023-03-05 PROCEDURE — 82077 ASSAY SPEC XCP UR&BREATH IA: CPT | Performed by: STUDENT IN AN ORGANIZED HEALTH CARE EDUCATION/TRAINING PROGRAM

## 2023-03-05 PROCEDURE — 80307 DRUG TEST PRSMV CHEM ANLYZR: CPT | Performed by: STUDENT IN AN ORGANIZED HEALTH CARE EDUCATION/TRAINING PROGRAM

## 2023-03-05 PROCEDURE — 99285 EMERGENCY DEPT VISIT HI MDM: CPT | Mod: CS,,, | Performed by: STUDENT IN AN ORGANIZED HEALTH CARE EDUCATION/TRAINING PROGRAM

## 2023-03-05 PROCEDURE — 87389 HIV-1 AG W/HIV-1&-2 AB AG IA: CPT | Performed by: PHYSICIAN ASSISTANT

## 2023-03-05 PROCEDURE — 84443 ASSAY THYROID STIM HORMONE: CPT | Performed by: STUDENT IN AN ORGANIZED HEALTH CARE EDUCATION/TRAINING PROGRAM

## 2023-03-05 PROCEDURE — 80143 DRUG ASSAY ACETAMINOPHEN: CPT | Performed by: STUDENT IN AN ORGANIZED HEALTH CARE EDUCATION/TRAINING PROGRAM

## 2023-03-05 PROCEDURE — 86803 HEPATITIS C AB TEST: CPT | Performed by: PHYSICIAN ASSISTANT

## 2023-03-05 PROCEDURE — 99285 PR EMERGENCY DEPT VISIT,LEVEL V: ICD-10-PCS | Mod: CS,,, | Performed by: STUDENT IN AN ORGANIZED HEALTH CARE EDUCATION/TRAINING PROGRAM

## 2023-03-05 PROCEDURE — 87591 N.GONORRHOEAE DNA AMP PROB: CPT | Performed by: STUDENT IN AN ORGANIZED HEALTH CARE EDUCATION/TRAINING PROGRAM

## 2023-03-05 RX ORDER — CEPHALEXIN 500 MG/1
500 CAPSULE ORAL 4 TIMES DAILY
Qty: 20 CAPSULE | Refills: 0 | Status: SHIPPED | OUTPATIENT
Start: 2023-03-05 | End: 2023-03-10

## 2023-03-05 RX ORDER — CEPHALEXIN 500 MG/1
500 CAPSULE ORAL
Status: COMPLETED | OUTPATIENT
Start: 2023-03-05 | End: 2023-03-05

## 2023-03-05 RX ADMIN — CEPHALEXIN 500 MG: 500 CAPSULE ORAL at 08:03
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
## 2023-03-05 NOTE — Clinical Note
"Vadim Rauschno Moyer was seen and treated in our emergency department on 3/5/2023.  He may return to work on 03/12/2023.  Regimen was seen in the emergency department at [facility] on 03/05/2023.  Patient was admitted.  Please excuse the patient from work while the patient was admitted to the hospital.     If you have any questions or concerns, please don't hesitate to call.      Chloé Greenberg MD"

## 2023-03-05 NOTE — PLAN OF CARE
SW spoke with pt to assist with calling his work and friend Paulo.    SW contacted pt's friend Paulo [phone] and pt spoke with friend on speaker phone to discuss [place] [facility]    MALCOLM contacted pt place of employment [employer] [address] [phone] and spoke with the manager/supervisor on shift Jm and explained that pt is at [facility] and asked what documentation pt required for his workplace.  Jm stated pt needed a doctors note.    Pt also stated he may need documentation for a court date he has as well.    Bedside RN notified    Cristela Araujo, RC, MSW, LMSW, RSW   Case Management  [facility]  Email: [email or web address]

## 2023-03-06 VITALS
OXYGEN SATURATION: 98 % | DIASTOLIC BLOOD PRESSURE: 82 MMHG | SYSTOLIC BLOOD PRESSURE: 148 MMHG | BODY MASS INDEX: 24.33 KG/M2 | HEART RATE: 60 BPM | RESPIRATION RATE: 16 BRPM | WEIGHT: 160 LBS | TEMPERATURE: 99 F

## 2023-03-06 LAB
C TRACH DNA SPEC QL NAA+PROBE: NOT DETECTED
N GONORRHOEA DNA SPEC QL NAA+PROBE: NOT DETECTED

## 2023-03-06 PROCEDURE — S4991 NICOTINE PATCH NONLEGEND: HCPCS | Performed by: EMERGENCY MEDICINE

## 2023-03-06 PROCEDURE — 25000003 PHARM REV CODE 250: Performed by: EMERGENCY MEDICINE

## 2023-03-06 RX ORDER — IBUPROFEN 200 MG
1 TABLET ORAL ONCE
Status: DISCONTINUED | OUTPATIENT
Start: 2023-03-06 | End: 2023-03-06 | Stop reason: HOSPADM

## 2023-03-06 RX ADMIN — NICOTINE 1 PATCH: 14 PATCH, EXTENDED RELEASE TRANSDERMAL at 09:03
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
## 2023-03-06 NOTE — ED NOTES
Assumed pt care, he is lying on the stretcher resting NAD noted. PCA at the bedside DVC maintained.

## 2023-03-06 NOTE — ED NOTES
Pt escorted off of the unit on a stretcher bby ED and security staff. Pt's PEC,CEC, transfer form, and all belongings given to [facility] staff. Pt remained calm and cooperative for the transfer. Pt did not want to notify his mother of his transfer.    Patient/Caregiver provided printed discharge information.

## 2023-03-06 NOTE — ED NOTES
Report received from JAMES Plascencia. 1:1 sitter in place. No needs observed or expressed at this time. Will continue to monitor.

## 2023-03-06 NOTE — ED NOTES
Received report from JAMES Rojo. Pt resting in bed with eyes closed. No complaints at this time. Pt dressed in paper scrubs per hospital policy. All harmful objects removed from room. Sitter at bedside maintaining direct 1-1 visualization.

## 2023-03-06 NOTE — ED NOTES
"Received call from pt's mother, Dwayne Moyer.     She stated that she is scared of him and wanted to know expected release. Advised that he will be transferred to another facility.     She was hesitant to give information regarding the situation. Stated that he was "acting up and throwing things" refused to elaborate. Asked if pt verbalized self harm or harm to others. She stated that he threatened to harm another. When further prompted she stated that there was no physical attempt, only a verbal threat.     She stated that his psych Hx includes: ADHD, ODD, bipolar, and schizoaffective disorder. She stated that he hasn't taken meds in a few years   "

## 2023-03-06 NOTE — ED NOTES
Pt remains in paper scrubs.  Room free from hazardous items.  Sitter remains at bedside with direct visual contact and q15min assessments being documented.  Pt in NAD and VSS at this time.  Pending medical clearance and psych placement.

## 2023-03-07 LAB — BACTERIA UR CULT: NO GROWTH

## 2023-07-05 ENCOUNTER — OFFICE VISIT (OUTPATIENT)
Dept: URGENT CARE | Facility: CLINIC | Age: 23
End: 2023-07-05
Payer: MEDICAID

## 2023-07-05 VITALS
WEIGHT: 160 LBS | TEMPERATURE: 99 F | BODY MASS INDEX: 24.25 KG/M2 | DIASTOLIC BLOOD PRESSURE: 74 MMHG | RESPIRATION RATE: 20 BRPM | OXYGEN SATURATION: 97 % | HEART RATE: 86 BPM | SYSTOLIC BLOOD PRESSURE: 115 MMHG | HEIGHT: 68 IN

## 2023-07-05 DIAGNOSIS — R05.8 COUGH WITH CONGESTION OF PARANASAL SINUS: ICD-10-CM

## 2023-07-05 DIAGNOSIS — J45.21 MILD INTERMITTENT ASTHMA WITH EXACERBATION: ICD-10-CM

## 2023-07-05 DIAGNOSIS — R06.2 WHEEZING ON AUSCULTATION: ICD-10-CM

## 2023-07-05 DIAGNOSIS — R42 DIZZINESS: ICD-10-CM

## 2023-07-05 DIAGNOSIS — T67.5XXA HEAT EXHAUSTION, INITIAL ENCOUNTER: Primary | ICD-10-CM

## 2023-07-05 DIAGNOSIS — R09.81 COUGH WITH CONGESTION OF PARANASAL SINUS: ICD-10-CM

## 2023-07-05 DIAGNOSIS — Z11.52 ENCOUNTER FOR SCREENING FOR COVID-19: ICD-10-CM

## 2023-07-05 LAB
BILIRUB UR QL STRIP: NEGATIVE
CTP QC/QA: YES
GLUCOSE UR QL STRIP: NEGATIVE
KETONES UR QL STRIP: NEGATIVE
LEUKOCYTE ESTERASE UR QL STRIP: NEGATIVE
PH, POC UA: 5.5
POC BLOOD, URINE: NEGATIVE
POC NITRATES, URINE: NEGATIVE
PROT UR QL STRIP: NEGATIVE
SARS-COV-2 AG RESP QL IA.RAPID: NEGATIVE
SITTINGSUPINE: NORMAL
SP GR UR STRIP: 1.02 (ref 1–1.03)
UROBILINOGEN UR STRIP-ACNC: NORMAL (ref 0.3–2.2)

## 2023-07-05 PROCEDURE — 71046 X-RAY EXAM CHEST 2 VIEWS: CPT | Mod: FY,S$GLB,, | Performed by: RADIOLOGY

## 2023-07-05 PROCEDURE — 99215 PR OFFICE/OUTPT VISIT, EST, LEVL V, 40-54 MIN: ICD-10-PCS | Mod: S$GLB,,, | Performed by: PHYSICIAN ASSISTANT

## 2023-07-05 PROCEDURE — 81003 POCT URINALYSIS, DIPSTICK, AUTOMATED, W/O SCOPE: ICD-10-PCS | Mod: QW,S$GLB,, | Performed by: PHYSICIAN ASSISTANT

## 2023-07-05 PROCEDURE — 71046 XR CHEST PA AND LATERAL: ICD-10-PCS | Mod: FY,S$GLB,, | Performed by: RADIOLOGY

## 2023-07-05 PROCEDURE — 87811 SARS-COV-2 COVID19 W/OPTIC: CPT | Mod: QW,S$GLB,, | Performed by: PHYSICIAN ASSISTANT

## 2023-07-05 PROCEDURE — 81003 URINALYSIS AUTO W/O SCOPE: CPT | Mod: QW,S$GLB,, | Performed by: PHYSICIAN ASSISTANT

## 2023-07-05 PROCEDURE — 87811 SARS CORONAVIRUS 2 ANTIGEN POCT, MANUAL READ: ICD-10-PCS | Mod: QW,S$GLB,, | Performed by: PHYSICIAN ASSISTANT

## 2023-07-05 PROCEDURE — 99215 OFFICE O/P EST HI 40 MIN: CPT | Mod: S$GLB,,, | Performed by: PHYSICIAN ASSISTANT

## 2023-07-05 RX ORDER — FLUTICASONE PROPIONATE 50 MCG
1 SPRAY, SUSPENSION (ML) NASAL 2 TIMES DAILY PRN
Qty: 16 G | Refills: 0 | Status: SHIPPED | OUTPATIENT
Start: 2023-07-05

## 2023-07-05 RX ORDER — BENZONATATE 200 MG/1
200 CAPSULE ORAL 3 TIMES DAILY PRN
Qty: 30 CAPSULE | Refills: 0 | Status: SHIPPED | OUTPATIENT
Start: 2023-07-05 | End: 2023-07-15

## 2023-07-05 RX ORDER — ALBUTEROL SULFATE 90 UG/1
2 AEROSOL, METERED RESPIRATORY (INHALATION) EVERY 6 HOURS PRN
Qty: 18 G | Refills: 0 | Status: SHIPPED | OUTPATIENT
Start: 2023-07-05 | End: 2024-07-04

## 2023-07-05 RX ORDER — ALBUTEROL SULFATE 0.83 MG/ML
2.5 SOLUTION RESPIRATORY (INHALATION) EVERY 6 HOURS PRN
Qty: 93 ML | Refills: 0 | Status: SHIPPED | OUTPATIENT
Start: 2023-07-05 | End: 2024-07-04

## 2023-07-05 RX ORDER — CETIRIZINE HYDROCHLORIDE 10 MG/1
10 TABLET ORAL DAILY PRN
Qty: 30 TABLET | Refills: 0 | Status: SHIPPED | OUTPATIENT
Start: 2023-07-05 | End: 2024-07-04

## 2023-07-05 RX ORDER — BROMPHENIRAMINE MALEATE, PSEUDOEPHEDRINE HYDROCHLORIDE, AND DEXTROMETHORPHAN HYDROBROMIDE 2; 30; 10 MG/5ML; MG/5ML; MG/5ML
5 SYRUP ORAL
Qty: 220 ML | Refills: 0 | Status: SHIPPED | OUTPATIENT
Start: 2023-07-05 | End: 2023-07-15

## 2023-07-05 NOTE — PATIENT INSTRUCTIONS
PLEASE READ YOUR DISCHARGE INSTRUCTIONS ENTIRELY AS IT CONTAINS IMPORTANT INFORMATION.    Patient had covid testing done today.    Discussed corona virus precautions and reviewed [facility] FAC; printed a copy for patient.  I discussed to continue to monitor their symptoms. Discussed that if their symptoms persist or worsen to seek re-evaluation. Clinic vs. ER precautions were given.  Patient verbalized understanding and agreed with the entire plan of care.    If Negative and no direct exposure: symptom free without fever reducing meds in 24 hours - can go back to work in 24 hours with surgical mask for 10-14 days.      - Reviewed radiographs and all diagnostic testing with patient/family.    - Rest.  Drink plenty of fluids.    - Tylenol OR anti-inflammatory (NSAIDs, ibuprofen, aleve, motrin) as directed as needed for fever/pain.  For Tylenol, do not exceed 3000 mg/ day. If no contraindication or allergies.    - continue albuterol inhaler or nebulizer as needed for shortness of breath/wheezing  - take Tessalon as needed for cough suppression.   --prescribed cough syrup (BromFed DM) that contains Mucinex DM and Sudafed and maybe taken every 4-6 hours.  Do not combine with over-the-counter Mucinex DM and Sudafed if you are taking this cough syrup.      -Below are suggestions for symptomatic relief:              -Salt water gargles to soothe throat pain.              -Chloroseptic spray also helps to numb throat pain. Drink hot tea with honey or lemon to soothe your throat.              -Nasal saline spray reduces inflammation and dryness.              -Warm face compresses to help with facial sinus pain/pressure.              -Vicks vapor rub at night.              **may also supplement with OTC nasal spray to help with inflammation and congestion.   Wean to off when you nose becomes to dry or bleed. Also use nasal saline twice a day to help with dryness.               -Flonase OTC or Nasacort OTC  once or twice a day for  nasal/sinus congestion. DON'T USE IF YOU HAVE GLAUCOMA. CHECK WITH YOUR PHARMACIST/PHYSICIAN.              -Simple foods like chicken noodle soup.              -Mucinex DM (ANY COUGH EXPECTORANT-- guaifenesin) for cough or chest congestion with mucus and (ANY COUGH SUPPRESSANT- dextromethorphan) helps with coughing every 12 hours. Mucinex-DM if you have chest congestion or sputum (caution if history of high blood pressure or palpitations).              -Zyrtec/Claritin/xyzal during the day time  & Benadryl at night (only if severe runny nose) may help with allergies and runny nose. Add decongestant if you have nasal/sinus congestion/sinus pressure/ear fullness sensation. (see below)              -may take OTC meclizine as needed for dizziness or nausea.     Caution with use of Decongestant meds:  -If you DO NOT have Hypertension or any history of palpitations, it is ok to take over the counter Sudafed or Mucinex D or Allegra-D or Claritin-D or Zyrtec-D.  -If you do take one of the above, it is ok to combine that with plain over the counter Mucinex or Allegra or Claritin or Zyrtec. If, for example, you are taking Zyrtec -D, you can combine that with Mucinex, but not Mucinex-D.  If you are taking Mucinex-D, you can combine that with plain Allegra or Claritin or Zyrtec.     -Do not combine pseudophed or phenylephrine with any other brand allergy-D for DECONGESTANT.   -Or vice versa, you can you take plain allergy medications (allegra/claritin/zyrtec with NO Decongestant) and ADD OTC pseudophed or phenelyphrine 3 times a day (or every 4-6 hours needed). Avoid taking decongestant late at night or with caffeine as it can keep you up or cause jittery feeling.     -If you DO have Hypertension , anxiety, or palpitations, it is safe to take Coricidin HBP for relief of cough, congestion, or sinus symptoms every 4-6 hours.      For your GI symptoms:  -Use gatorade/pedialyte or rehydration packets to help stay hydrated. Vitamin  water and plain water do not contain rehydrating electrolytes.  -Increase clear liquids (water, gatorade, pedialyte, broths, jello, etc) Hold off on solids for 12-18 hours.     -Please go to the ER if you experience worsening abdominal pain, blood in your vomit or stool, high fever, dizziness, fainting, swelling of your abdomen, inability to pass gas or stool, or inability to urinate.     -You must understand that you've received an Urgent Care treatment only and that you may be released before all your medical problems are known or treated. You, the patient, will arrange for follow up care as instructed. Please arrange follow up with your primary medical clinic within 2-5 days if your signs and symptoms have not resolved or worsen.     - Follow up with your PCP or specialty clinic as directed.  You can call [phone] or [phone] to schedule an appointment with the appropriate provider.  Schedule CENTER is open Mon-Friday 8-5pm (excluded holidays).    - If your condition worsens or fails to improve we recommend that you receive another evaluation at the emergency room immediately or contact your primary medical clinic to discuss your concerns.        Prevention steps for patients with confirmed or suspected COVID-19  Stay home and stay away from family members and friends. The CDC says, you can leave home after these three things have happened: 1) You have had no fever for at least 24 hours (that is one full day of no fever without the use of medicine that reduces fevers) 2) AND other symptoms have improved (for example, when your cough or shortness of breath have improved) 3) AND at least 10 days have passed since your symptoms first appeared OR after 6-10 days passed from first positive test (with mask on till day #11).  Separate yourself from other people and animals in your home.  Call ahead before visiting your doctor.  Wear a facemask.  Cover your coughs and sneezes.  Wash your hands often with soap  and water; hand  can be used, too.  Avoid sharing personal household items.  Wipe down surfaces used daily.  Monitor your symptoms. Seek prompt medical attention if your illness is worsening (e.g., difficulty breathing).   Before seeking care, call your healthcare provider.  If you have a medical emergency and need to call 911, notify the dispatch personnel that you have, or are being evaluated for COVID-19. If possible, put on a facemask before emergency medical services arrive.      Recommended precautions for household members, intimate partners, and caregivers in a home setting of a patient with symptomatic laboratory-confirmed COVID-19 or a patient under investigation.  Household members, intimate partners, and caregivers in the home setting awaiting tests results have close contact with a person with symptomatic, laboratory-confirmed COVID-19 or a person under investigation. Close contacts should monitor their health; they should call their provider right away if they develop symptoms suggestive of COVID-19 (e.g., fever, cough, shortness of breath).    Close contacts should also follow these recommendations:  Make sure that you understand and can help the patient follow their provider's instructions for medication(s) and care. You should help the patient with basic needs in the home and provide support for getting groceries, prescriptions, and other personal needs.  Monitor the patient's symptoms. If the patient is getting sicker, call his or her healthcare provider and tell them that the patient has laboratory-confirmed COVID-19. If the patient has a medical emergency and you need to call 911, notify the dispatch personnel that the patient has, or is being evaluated for COVID-19.  Household members should stay in another room or be  from the patient. Household members should use a separate bedroom and bathroom, if available.  Prohibit visitors.  Household members should care for any pets in  the home.  Make sure that shared spaces in the home have good air flow, such as by an air conditioner or an opened window, weather permitting.  Perform hand hygiene frequently. Wash your hands often with soap and water for at least 20 seconds or use an alcohol-based hand  (that contains > 60% alcohol) covering all surfaces of your hands and rubbing them together until they feel dry. Soap and water should be used preferentially.  Avoid touching your eyes, nose, and mouth.  The patient should wear a facemask. If the patient is not able to wear a facemask (for example, because it causes trouble breathing), caregivers should wear a mask when they are in the same room as the patient.  Wear a disposable facemask and gloves when you touch or have contact with the patient's blood, stool, or body fluids, such as saliva, sputum, nasal mucus, vomit, urine.  Throw out disposable facemasks and gloves after using them. Do not reuse.  When removing personal protective equipment, first remove and dispose of gloves. Then, immediately clean your hands with soap and water or alcohol-based hand . Next, remove and dispose of facemask, and immediately clean your hands again with soap and water or alcohol-based hand .  You should not share dishes, drinking glasses, cups, eating utensils, towels, bedding, or other items with the patient. After the patient uses these items, you should wash them thoroughly (see below Wash laundry thoroughly).  Clean all high-touch surfaces, such as counters, tabletops, doorknobs, bathroom fixtures, toilets, phones, keyboards, tablets, and bedside tables, every day. Also, clean any surfaces that may have blood, stool, or body fluids on them.  Use a household cleaning spray or wipe, according to the label instructions. Labels contain instructions for safe and effective use of the cleaning product including precautions you should take when applying the product, such as wearing  gloves and making sure you have good ventilation during use of the product.  Wash laundry thoroughly.  Immediately remove and wash clothes or bedding that have blood, stool, or body fluids on them.  Wear disposable gloves while handling soiled items and keep soiled items away from your body. Clean your hands (with soap and water or an alcohol-based hand ) immediately after removing your gloves.  Read and follow directions on labels of laundry or clothing items and detergent. In general, using a normal laundry detergent according to washing machine instructions and dry thoroughly using the warmest temperatures recommended on the clothing label.  Place all used disposable gloves, facemasks, and other contaminated items in a lined container before disposing of them with other household waste. Clean your hands (with soap and water or an alcohol-based hand ) immediately after handling these items. Soap and water should be used preferentially if hands are visibly dirty.  Discuss any additional questions with your state or local health department or healthcare provider. Check available hours when contacting your local health department.    For more information see CDC link below.      https://www.cdc.gov/coronavirus/2019-ncov/hcp/guidance-prevent-spread.html#precautions        Sources:  [facility], [place] [facility]          Instructions for Home Care of Patients and Caretakers with Coronavirus Disease 2019  Limit visitors to the home.  Older persons and those that have chronic medical conditions such as diabetes, lung and heart disease are at increased risk for illness.   If possible, patients should use a separate bedroom while recovering. Caregivers and household members should avoid prolonged contact with the patient which means to stay 6 feet away and avoid contact with cough droplets.  When close contact is necessary, wash your hands before and immediately after contact.    Perform hand hygiene frequently. Wash your hands often with soap and water for at least 20 seconds or use an alcohol-based hand , covering all surfaces of your hands and rubbing them together until they feel dry.   Avoid touching your eyes, nose, and mouth with unwashed hands.  Avoid sharing household items with the patient. You should not share dishes, drinking glasses, cups, eating utensils, towels, bedding, or other items. After the patient uses these items, you should wash them thoroughly.  Wash laundry thoroughly.   Immediately remove and wash clothes or bedding that have blood, stool, or body fluids on them.  Clean all high-touch surfaces, such as counters, tabletops, doorknobs, bathroom fixtures, toilets, phones, keyboards, tablets, and bedside tables, every day.   Use a household cleaning spray or wipe, according to the label instructions. Labels contain instructions for safe and effective use of the cleaning product including precautions you should take when applying the product, such as wearing gloves and making sure you have good ventilation during use of the product.    For more information see CDC link below.      https://www.cdc.gov/coronavirus/2019-ncov/hcp/guidance-prevent-spread.html#precautions               If your symptoms worsen or if you have any other concerns, please contact [facility] at [phone].

## 2023-07-05 NOTE — PROGRESS NOTES
"Subjective:      Patient ID: Vadim Moyer is a 22 y.o. male.    Vitals:  height is 5' 8" (1.727 m) and weight is 72.6 kg (160 lb). His temperature is 98.7 °F (37.1 °C). His blood pressure is 115/74 and his pulse is 86. His respiration is 20 and oxygen saturation is 97%.     Chief Complaint: Headache    22-year-old male with extensive medical history including bipolar, asthma, everyday smoker via nicotine vaping, and other comorbidities who presents to urgent care clinic for evaluation.  He is here with multiple complaints.  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  Did not eat anything this morning prior to doing excessive work.  During the heat, he had lightheadedness, dizziness, headache, and fatigue.  Pain now mostly resolved since resting in our clinic and drinking 4 cups of water.  He also ache lumbar and potato chips from my clinic staff.  Mild light frontal headache rated as 2/10 after eating.    While he is here, he would like to discuss his asthma flare-up.  Started 2 days ago and is worse at night when he is lying down.  Has runny nose and associated cough.  Uses has albuterol inhaler as needed; use 2 hours prior to clinic arrival.  No sick contacts.  No other associated symptoms.  Requesting refill of albuterol inhaler.    Medical assistant note:  22 year old male presents today with a HA, lightheaded, dizziness, back pain in middle of back. Denies any Chest pain, blurred vision, nausea. States he was cutting grass this morning and feels like he is dehydrated. Treatments at home includes nothing.       Headache   This is a new problem. The current episode started today. The problem occurs constantly. The problem has been unchanged. The pain is located in the Frontal and parietal region. The pain does not radiate. The pain quality is similar to prior headaches. The quality of the pain is described as aching. The pain is at a severity of 2/10. Associated " symptoms include coughing and dizziness. Pertinent negatives include no abdominal pain, blurred vision, ear pain, eye pain, eye redness, fever, hearing loss, loss of balance, nausea, neck pain, numbness, photophobia, seizures, sinus pressure, sore throat or vomiting. Nothing aggravates the symptoms. He has tried nothing for the symptoms. His past medical history is significant for hypertension and migraine headaches.     Constitution: Positive for sweating and fatigue. Negative for activity change, chills, fever and generalized weakness.   HENT:  Positive for congestion and postnasal drip. Negative for ear pain, hearing loss, facial swelling, sinus pain, sinus pressure, sore throat, trouble swallowing and voice change.    Neck: Negative for neck pain, neck stiffness and painful lymph nodes.   Cardiovascular:  Negative for chest pain, leg swelling, palpitations, sob on exertion and passing out.   Eyes:  Negative for eye discharge, eye pain, eye redness, photophobia, vision loss, double vision, blurred vision and eyelid swelling.   Respiratory:  Positive for cough, wheezing and asthma. Negative for chest tightness, sputum production, COPD and shortness of breath.    Gastrointestinal:  Negative for abdominal pain, nausea, vomiting, diarrhea, bright red blood in stool, dark colored stools, rectal bleeding, heartburn and bowel incontinence.   Genitourinary:  Negative for dysuria, frequency, urgency, urine decreased, flank pain, bladder incontinence, hematuria and history of kidney stones.   Musculoskeletal:  Negative for trauma, joint pain, joint swelling, abnormal ROM of joint, muscle cramps and muscle ache.   Skin:  Negative for color change, pale, rash and wound.   Allergic/Immunologic: Positive for asthma. Negative for seasonal allergies and immunocompromised state.   Neurological:  Positive for dizziness, light-headedness, headaches and history of migraines. Negative for history of vertigo, passing out, facial  drooping, speech difficulty, coordination disturbances, loss of balance, disorientation, altered mental status, loss of consciousness, numbness, tingling and seizures.   Hematologic/Lymphatic: Negative for swollen lymph nodes, easy bruising/bleeding and trouble clotting. Does not bruise/bleed easily.   Psychiatric/Behavioral:  Negative for altered mental status and disorientation.       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 He appears well-developed. He is cooperative. He does not appear ill. No distress.      Comments:Well-appearing     HENT:   Head: Normocephalic.      Comments: No orthostatic symptoms on exam  Ears:   Right Ear: Hearing, external ear and ear canal normal. No no drainage, swelling or tenderness. No mastoid tenderness.   Left Ear: Hearing, external ear and ear canal normal. No no drainage, swelling or tenderness. No mastoid tenderness.   Nose: Rhinorrhea present. Right sinus exhibits no maxillary sinus tenderness and no frontal sinus tenderness. Left sinus exhibits no maxillary sinus tenderness and no frontal sinus tenderness.   Mouth/Throat: Uvula is midline, oropharynx is clear and moist and mucous membranes are normal. Mucous membranes are moist. No oral lesions. No trismus in the jaw. No uvula swelling. No oropharyngeal exudate, posterior oropharyngeal edema or posterior oropharyngeal erythema. No tonsillar exudate. Oropharynx is clear.   Eyes: Conjunctivae, EOM and lids are normal. Right eye exhibits no discharge. Left eye exhibits no discharge. Right conjunctiva is not injected. Right conjunctiva has no hemorrhage. Left conjunctiva is not injected. Left conjunctiva has no hemorrhage.  Extraocular movement intact vision grossly intact gaze aligned appropriately   Neck: Phonation normal. Neck supple. No neck rigidity present.   Cardiovascular: Normal rate, regular rhythm, normal heart sounds and normal pulses.   No murmur heard.  Pulmonary/Chest: Effort normal. No accessory muscle usage. No respiratory distress. He has wheezes. He exhibits no tenderness.         Comments: Wheezing on expiratory in all lung fields    Abdominal: Normal appearance. He exhibits no distension. Soft. There is no abdominal tenderness. There is no rebound, no guarding, no left CVA tenderness and no right CVA tenderness.   Musculoskeletal: Normal range of motion.         General: Normal range of motion.      Comments: Moves all extremities with normal tone, strength, and ROM.  Gait normal.   Lymphadenopathy:     He has no cervical adenopathy.   Neurological: no focal deficit. He is alert, oriented to person, place, and time and at baseline. He has normal motor skills and normal sensation. He displays facial symmetry and no dysarthria. He exhibits normal muscle tone. Gait and coordination normal. Coordination normal. GCS eye subscore is 4. GCS verbal subscore is 5. GCS motor subscore is 6.   Skin: Skin is warm, dry and no rash. Capillary refill takes less than 2 seconds.   Psychiatric: He experiences Normal attention. His speech is normal and behavior is normal. Thought content normal.   Nursing note and vitals reviewed.  Results for orders placed or performed in visit on 07/05/23   Orthostatic vital signs   Result Value Ref Range    Interpretation     POCT Urinalysis, Dipstick, Automated, W/O Scope   Result Value Ref Range    POC Blood, Urine Negative Negative    POC Bilirubin, Urine Negative Negative    POC Urobilinogen, Urine NORM 0.3 - 2.2    POC Ketones, Urine Negative Negative    POC Protein, Urine Negative Negative    POC Nitrates, Urine Negative Negative    POC Glucose, Urine Negative Negative    pH, UA 5.5     POC  "Specific Gravity, Urine 1.020 1.003 - 1.029    POC Leukocytes, Urine Negative Negative   SARS Coronavirus 2 Antigen, POCT Manual Read   Result Value Ref Range    SARS Coronavirus 2 Antigen Negative Negative     Acceptable Yes      XR CHEST PA AND LATERAL    Result Date: 7/5/2023  EXAMINATION: XR CHEST PA AND LATERAL TECHNIQUE: Two views of the chest were obtained, with PA and lateral projections submitted. COMPARISON: Comparison is made to 07/26/2021 and 03/02/2020. FINDINGS: Heart size is normal, as is the appearance of the pulmonary vascularity. Lung zones are clear, and are free of significant airspace consolidation or volume loss. No pleural fluid. No hilar or mediastinal mass lesion. No pneumothorax or pneumomediastinum.     No significant intrathoracic abnormality. No significant detrimental interval change in the appearance of the chest since the examinations referenced above is appreciated. Electronically signed by: Abhijit Robles MD Date:    07/05/2023 Time:    13:13       Vitals:    07/05/23 1208 07/05/23 1222 07/05/23 1224 07/05/23 1229   BP: 129/79 123/74 104/69 115/74   BP Location:  Left arm Left arm Left arm   Patient Position:  Lying Standing Standing   BP Method:  Large (Automatic) Large (Automatic) Large (Automatic)   Pulse: 92 76 86 86   Resp: 20      Temp: 98.7 °F (37.1 °C)      SpO2: 97% (!) 94% 95% 97%   Weight: 72.6 kg (160 lb)      Height: 5' 8" (1.727 m)          Assessment:     1. Heat exhaustion, initial encounter    2. Dizziness    3. Mild intermittent asthma with exacerbation    4. Wheezing on auscultation    5. Cough with congestion of paranasal sinus    6. Encounter for screening for COVID-19    Note dictated with voice recognition software, please excuse any grammatical errors.    Nontoxic appearing. Vitals are stable. Patient has symptoms at this time which is consistent with above diagnosis.    Clinic orders: Rapid covid negative.   Chest x-ray with no acute " cardiopulmonary finding.    Urinalysis within normal limits.  Orthostatic vitals within normal limits.  All diagnostic testing personally reviewed and interpreted.   Previous progress notes and diagnostic testing reviewed and interpreted.    Heat exhaustion,   -recommend increase oral hydration and supplement electrolyte fluids orally.    -tolerated 4 cups of water in clinic with no issues.  Tolerated potato chips and coronavirus clinic with no issues.    Asthma exacerbation,   -recommended tobacco cessation,  -Patient was also prescribed medications for their symptoms.   Patient was recommended OTC treatments for their symptoms.   Emphasized the importance of OTC symptomatic treatment for improvements in symptoms and prevent further worsening of conditioning.  Recommend oral antihistamine q.a.m., antihistamine nasal spray 1-2 times per day, Mucinex DM Q 12 hour, and Coricidin/Sudafed q.4-6 hours in addition to prescribed medication.  Paper prescription given for nebulizer machine.     Patient has acute exacerbation of chronic illness/acute complicated injury. We did discuss the risk of morbidity without treatment is significant. We had shared medical decision making for patient's treatment and care.     Patient was counseled, explained with the test results meaning, expected course, and answered all of questions. They can also receive results via my chart.  Printed and verbal treatment guidelines/recommendations were given.   Recommend follow-up PCP in the next 2-3 days if new or worsening symptoms.     Patient understands that they received an Urgent Care treatment only and that they may be released before all your medical problems are known or treated. Strict ED versus clinic precautions given.  Patient verbalized understanding and agreed with plan of care.    Note dictated with voice recognition software, please excuse any grammatical errors.      Plan:       Heat exhaustion, initial encounter  -     Orthostatic  vital signs    Dizziness  -     POCT Urinalysis, Dipstick, Automated, W/O Scope  -     Orthostatic vital signs    Mild intermittent asthma with exacerbation  -     SARS Coronavirus 2 Antigen, POCT Manual Read  -     XR CHEST PA AND LATERAL; Future; Expected date: 07/05/2023  -     cetirizine (ZYRTEC) 10 MG tablet; Take 1 tablet (10 mg total) by mouth daily as needed for Allergies or Rhinitis.  Dispense: 30 tablet; Refill: 0  -     fluticasone propionate (FLONASE) 50 mcg/actuation nasal spray; 1 spray (50 mcg total) by Each Nostril route 2 (two) times daily as needed for Rhinitis or Allergies.  Dispense: 16 g; Refill: 0  -     benzonatate (TESSALON) 200 MG capsule; Take 1 capsule (200 mg total) by mouth 3 (three) times daily as needed for Cough (Cough).  Dispense: 30 capsule; Refill: 0  -     brompheniramine-pseudoeph-DM (BROMFED DM) 2-30-10 mg/5 mL Syrp; Take 5 mLs by mouth every 4 to 6 hours as needed (Cough and nasal/sinus/ear/chest congestion).  Dispense: 220 mL; Refill: 0  -     albuterol (VENTOLIN HFA) 90 mcg/actuation inhaler; Inhale 2 puffs into the lungs every 6 (six) hours as needed for Wheezing or Shortness of Breath. Rescue  Dispense: 18 g; Refill: 0  -     albuterol (PROVENTIL) 2.5 mg /3 mL (0.083 %) nebulizer solution; Take 3 mLs (2.5 mg total) by nebulization every 6 (six) hours as needed for Wheezing or Shortness of Breath. Rescue  Dispense: 93 mL; Refill: 0    Wheezing on auscultation  -     XR CHEST PA AND LATERAL; Future; Expected date: 07/05/2023  -     albuterol (VENTOLIN HFA) 90 mcg/actuation inhaler; Inhale 2 puffs into the lungs every 6 (six) hours as needed for Wheezing or Shortness of Breath. Rescue  Dispense: 18 g; Refill: 0  -     albuterol (PROVENTIL) 2.5 mg /3 mL (0.083 %) nebulizer solution; Take 3 mLs (2.5 mg total) by nebulization every 6 (six) hours as needed for Wheezing or Shortness of Breath. Rescue  Dispense: 93 mL; Refill: 0    Cough with congestion of paranasal sinus  -      cetirizine (ZYRTEC) 10 MG tablet; Take 1 tablet (10 mg total) by mouth daily as needed for Allergies or Rhinitis.  Dispense: 30 tablet; Refill: 0  -     fluticasone propionate (FLONASE) 50 mcg/actuation nasal spray; 1 spray (50 mcg total) by Each Nostril route 2 (two) times daily as needed for Rhinitis or Allergies.  Dispense: 16 g; Refill: 0  -     benzonatate (TESSALON) 200 MG capsule; Take 1 capsule (200 mg total) by mouth 3 (three) times daily as needed for Cough (Cough).  Dispense: 30 capsule; Refill: 0  -     brompheniramine-pseudoeph-DM (BROMFED DM) 2-30-10 mg/5 mL Syrp; Take 5 mLs by mouth every 4 to 6 hours as needed (Cough and nasal/sinus/ear/chest congestion).  Dispense: 220 mL; Refill: 0  -     albuterol (VENTOLIN HFA) 90 mcg/actuation inhaler; Inhale 2 puffs into the lungs every 6 (six) hours as needed for Wheezing or Shortness of Breath. Rescue  Dispense: 18 g; Refill: 0  -     albuterol (PROVENTIL) 2.5 mg /3 mL (0.083 %) nebulizer solution; Take 3 mLs (2.5 mg total) by nebulization every 6 (six) hours as needed for Wheezing or Shortness of Breath. Rescue  Dispense: 93 mL; Refill: 0    Encounter for screening for COVID-19             Additional MDM:     Heart Failure Score:   COPD = No    Patient Instructions     PLEASE READ YOUR DISCHARGE INSTRUCTIONS ENTIRELY AS IT CONTAINS IMPORTANT INFORMATION.    Patient had covid testing done today.    Discussed corona virus precautions and reviewed CDC FAC; printed a copy for patient.  I discussed to continue to monitor their symptoms. Discussed that if their symptoms persist or worsen to seek re-evaluation. Clinic vs. ER precautions were given.  Patient verbalized understanding and agreed with the entire plan of care.    If Negative and no direct exposure: symptom free without fever reducing meds in 24 hours - can go back to work in 24 hours with surgical mask for 10-14 days.      - Reviewed radiographs and all diagnostic testing with patient/family.    - Rest.   Drink plenty of fluids.    - Tylenol OR anti-inflammatory (NSAIDs, ibuprofen, aleve, motrin) as directed as needed for fever/pain.  For Tylenol, do not exceed 3000 mg/ day. If no contraindication or allergies.    - continue albuterol inhaler or nebulizer as needed for shortness of breath/wheezing  - take Tessalon as needed for cough suppression.   --prescribed cough syrup (BromFed DM) that contains Mucinex DM and Sudafed and maybe taken every 4-6 hours.  Do not combine with over-the-counter Mucinex DM and Sudafed if you are taking this cough syrup.      -Below are suggestions for symptomatic relief:              -Salt water gargles to soothe throat pain.              -Chloroseptic spray also helps to numb throat pain. Drink hot tea with honey or lemon to soothe your throat.              -Nasal saline spray reduces inflammation and dryness.              -Warm face compresses to help with facial sinus pain/pressure.              -Vicks vapor rub at night.              **may also supplement with OTC nasal spray to help with inflammation and congestion.   Wean to off when you nose becomes to dry or bleed. Also use nasal saline twice a day to help with dryness.               -Flonase OTC or Nasacort OTC  once or twice a day for nasal/sinus congestion. DON'T USE IF YOU HAVE GLAUCOMA. CHECK WITH YOUR PHARMACIST/PHYSICIAN.              -Simple foods like chicken noodle soup.              -Mucinex DM (ANY COUGH EXPECTORANT-- guaifenesin) for cough or chest congestion with mucus and (ANY COUGH SUPPRESSANT- dextromethorphan) helps with coughing every 12 hours. Mucinex-DM if you have chest congestion or sputum (caution if history of high blood pressure or palpitations).              -Zyrtec/Claritin/xyzal during the day time  & Benadryl at night (only if severe runny nose) may help with allergies and runny nose. Add decongestant if you have nasal/sinus congestion/sinus pressure/ear fullness sensation. (see below)               -may take OTC meclizine as needed for dizziness or nausea.     Caution with use of Decongestant meds:  -If you DO NOT have Hypertension or any history of palpitations, it is ok to take over the counter Sudafed or Mucinex D or Allegra-D or Claritin-D or Zyrtec-D.  -If you do take one of the above, it is ok to combine that with plain over the counter Mucinex or Allegra or Claritin or Zyrtec. If, for example, you are taking Zyrtec -D, you can combine that with Mucinex, but not Mucinex-D.  If you are taking Mucinex-D, you can combine that with plain Allegra or Claritin or Zyrtec.     -Do not combine pseudophed or phenylephrine with any other brand allergy-D for DECONGESTANT.   -Or vice versa, you can you take plain allergy medications (allegra/claritin/zyrtec with NO Decongestant) and ADD OTC pseudophed or phenelyphrine 3 times a day (or every 4-6 hours needed). Avoid taking decongestant late at night or with caffeine as it can keep you up or cause jittery feeling.     -If you DO have Hypertension , anxiety, or palpitations, it is safe to take Coricidin HBP for relief of cough, congestion, or sinus symptoms every 4-6 hours.      For your GI symptoms:  -Use gatorade/pedialyte or rehydration packets to help stay hydrated. Vitamin water and plain water do not contain rehydrating electrolytes.  -Increase clear liquids (water, gatorade, pedialyte, broths, jello, etc) Hold off on solids for 12-18 hours.     -Please go to the ER if you experience worsening abdominal pain, blood in your vomit or stool, high fever, dizziness, fainting, swelling of your abdomen, inability to pass gas or stool, or inability to urinate.     -You must understand that you've received an Urgent Care treatment only and that you may be released before all your medical problems are known or treated. You, the patient, will arrange for follow up care as instructed. Please arrange follow up with your primary medical clinic within 2-5 days if your signs and  symptoms have not resolved or worsen.     - Follow up with your PCP or specialty clinic as directed.  You can call [phone] or [phone] to schedule an appointment with the appropriate provider.  Schedule [facility] is open Mon-Friday 8-5pm (excluded holidays).    - If your condition worsens or fails to improve we recommend that you receive another evaluation at the emergency room immediately or contact your primary medical clinic to discuss your concerns.        Prevention steps for patients with confirmed or suspected COVID-19  Stay home and stay away from family members and friends. The CDC says, you can leave home after these three things have happened: 1) You have had no fever for at least 24 hours (that is one full day of no fever without the use of medicine that reduces fevers) 2) AND other symptoms have improved (for example, when your cough or shortness of breath have improved) 3) AND at least 10 days have passed since your symptoms first appeared OR after 6-10 days passed from first positive test (with mask on till day #11).  Separate yourself from other people and animals in your home.  Call ahead before visiting your doctor.  Wear a facemask.  Cover your coughs and sneezes.  Wash your hands often with soap and water; hand  can be used, too.  Avoid sharing personal household items.  Wipe down surfaces used daily.  Monitor your symptoms. Seek prompt medical attention if your illness is worsening (e.g., difficulty breathing).   Before seeking care, call your healthcare provider.  If you have a medical emergency and need to call 911, notify the dispatch personnel that you have, or are being evaluated for COVID-19. If possible, put on a facemask before emergency medical services arrive.      Recommended precautions for household members, intimate partners, and caregivers in a home setting of a patient with symptomatic laboratory-confirmed COVID-19 or a patient under investigation.  Household  members, intimate partners, and caregivers in the home setting awaiting tests results have close contact with a person with symptomatic, laboratory-confirmed COVID-19 or a person under investigation. Close contacts should monitor their health; they should call their provider right away if they develop symptoms suggestive of COVID-19 (e.g., fever, cough, shortness of breath).    Close contacts should also follow these recommendations:  Make sure that you understand and can help the patient follow their provider's instructions for medication(s) and care. You should help the patient with basic needs in the home and provide support for getting groceries, prescriptions, and other personal needs.  Monitor the patient's symptoms. If the patient is getting sicker, call his or her healthcare provider and tell them that the patient has laboratory-confirmed COVID-19. If the patient has a medical emergency and you need to call 911, notify the dispatch personnel that the patient has, or is being evaluated for COVID-19.  Household members should stay in another room or be  from the patient. Household members should use a separate bedroom and bathroom, if available.  Prohibit visitors.  Household members should care for any pets in the home.  Make sure that shared spaces in the home have good air flow, such as by an air conditioner or an opened window, weather permitting.  Perform hand hygiene frequently. Wash your hands often with soap and water for at least 20 seconds or use an alcohol-based hand  (that contains > 60% alcohol) covering all surfaces of your hands and rubbing them together until they feel dry. Soap and water should be used preferentially.  Avoid touching your eyes, nose, and mouth.  The patient should wear a facemask. If the patient is not able to wear a facemask (for example, because it causes trouble breathing), caregivers should wear a mask when they are in the same room as the patient.  Wear  a disposable facemask and gloves when you touch or have contact with the patient's blood, stool, or body fluids, such as saliva, sputum, nasal mucus, vomit, urine.  Throw out disposable facemasks and gloves after using them. Do not reuse.  When removing personal protective equipment, first remove and dispose of gloves. Then, immediately clean your hands with soap and water or alcohol-based hand . Next, remove and dispose of facemask, and immediately clean your hands again with soap and water or alcohol-based hand .  You should not share dishes, drinking glasses, cups, eating utensils, towels, bedding, or other items with the patient. After the patient uses these items, you should wash them thoroughly (see below Wash laundry thoroughly).  Clean all high-touch surfaces, such as counters, tabletops, doorknobs, bathroom fixtures, toilets, phones, keyboards, tablets, and bedside tables, every day. Also, clean any surfaces that may have blood, stool, or body fluids on them.  Use a household cleaning spray or wipe, according to the label instructions. Labels contain instructions for safe and effective use of the cleaning product including precautions you should take when applying the product, such as wearing gloves and making sure you have good ventilation during use of the product.  Wash laundry thoroughly.  Immediately remove and wash clothes or bedding that have blood, stool, or body fluids on them.  Wear disposable gloves while handling soiled items and keep soiled items away from your body. Clean your hands (with soap and water or an alcohol-based hand ) immediately after removing your gloves.  Read and follow directions on labels of laundry or clothing items and detergent. In general, using a normal laundry detergent according to washing machine instructions and dry thoroughly using the warmest temperatures recommended on the clothing label.  Place all used disposable gloves, facemasks,  and other contaminated items in a lined container before disposing of them with other household waste. Clean your hands (with soap and water or an alcohol-based hand ) immediately after handling these items. Soap and water should be used preferentially if hands are visibly dirty.  Discuss any additional questions with your [facility] or local health department or healthcare provider. Check available hours when contacting your local health department.    For more information see CDC link below.      https://www.cdc.gov/coronavirus/2019-ncov/hcp/guidance-prevent-spread.html#precautions        Sources:  [facility], [place] Department of [facility] and [facility]als          Instructions for Home Care of Patients and Caretakers with Coronavirus Disease 2019  Limit visitors to the home.  Older persons and those that have chronic medical conditions such as diabetes, lung and heart disease are at increased risk for illness.   If possible, patients should use a separate bedroom while recovering. Caregivers and household members should avoid prolonged contact with the patient which means to stay 6 feet away and avoid contact with cough droplets.  When close contact is necessary, wash your hands before and immediately after contact.   Perform hand hygiene frequently. Wash your hands often with soap and water for at least 20 seconds or use an alcohol-based hand , covering all surfaces of your hands and rubbing them together until they feel dry.   Avoid touching your eyes, nose, and mouth with unwashed hands.  Avoid sharing household items with the patient. You should not share dishes, drinking glasses, cups, eating utensils, towels, bedding, or other items. After the patient uses these items, you should wash them thoroughly.  Wash laundry thoroughly.   Immediately remove and wash clothes or bedding that have blood, stool, or body fluids on them.  Clean all high-touch surfaces, such as counters, tabletops, doorknobs, bathroom  fixtures, toilets, phones, keyboards, tablets, and bedside tables, every day.   Use a household cleaning spray or wipe, according to the label instructions. Labels contain instructions for safe and effective use of the cleaning product including precautions you should take when applying the product, such as wearing gloves and making sure you have good ventilation during use of the product.    For more information see CDC link below.      https://www.cdc.gov/coronavirus/2019-ncov/hcp/guidance-prevent-spread.html#precautions               If your symptoms worsen or if you have any other concerns, please contact [facility] at [phone].

## 2023-07-05 NOTE — LETTER
[address] ? [place], [address] ? Phone [phone] ? Fax [phone]           Return to Work/School    Patient: Vadim Moyer  YOB: 2000   Date: 07/05/2023      To Whom It May Concern:     Vadim Moyer was in contact with/seen in my office on 07/05/2023. COVID-19 is present in our communities across the state. Not all patients are eligible or appropriate to be tested. In this situation, your employee meets the following criteria:     Vadim Moyer has met the criteria for COVID-19 testing and has a NEGATIVE result. The employee can return to work once they are asymptomatic for 24 hours without the use of fever reducing medications (Tylenol, Motrin, etc). Okay to return back to work on 7/8/2023 pending above.      If you have any questions or concerns, or if I can be of further assistance, please do not hesitate to contact me.     Sincerely,    Earl Rivera PA-C

## 2023-07-20 ENCOUNTER — OFFICE VISIT (OUTPATIENT)
Dept: URGENT CARE | Facility: CLINIC | Age: 23
End: 2023-07-20
Payer: MEDICAID

## 2023-07-20 VITALS
HEART RATE: 93 BPM | SYSTOLIC BLOOD PRESSURE: 123 MMHG | HEIGHT: 68 IN | WEIGHT: 160 LBS | DIASTOLIC BLOOD PRESSURE: 86 MMHG | RESPIRATION RATE: 24 BRPM | BODY MASS INDEX: 24.25 KG/M2 | TEMPERATURE: 98 F | OXYGEN SATURATION: 97 %

## 2023-07-20 DIAGNOSIS — R06.02 SOB (SHORTNESS OF BREATH): ICD-10-CM

## 2023-07-20 DIAGNOSIS — J45.41 MODERATE PERSISTENT ASTHMA WITH EXACERBATION: Primary | ICD-10-CM

## 2023-07-20 LAB
CTP QC/QA: YES
SARS-COV-2 AG RESP QL IA.RAPID: NEGATIVE

## 2023-07-20 PROCEDURE — 87811 SARS CORONAVIRUS 2 ANTIGEN POCT, MANUAL READ: ICD-10-PCS | Mod: QW,S$GLB,, | Performed by: NURSE PRACTITIONER

## 2023-07-20 PROCEDURE — 94640 PR INHAL RX, AIRWAY OBST/DX SPUTUM INDUCT: ICD-10-PCS | Mod: S$GLB,,, | Performed by: NURSE PRACTITIONER

## 2023-07-20 PROCEDURE — 99214 PR OFFICE/OUTPT VISIT, EST, LEVL IV, 30-39 MIN: ICD-10-PCS | Mod: 25,S$GLB,, | Performed by: NURSE PRACTITIONER

## 2023-07-20 PROCEDURE — 99214 OFFICE O/P EST MOD 30 MIN: CPT | Mod: 25,S$GLB,, | Performed by: NURSE PRACTITIONER

## 2023-07-20 PROCEDURE — 94640 AIRWAY INHALATION TREATMENT: CPT | Mod: S$GLB,,, | Performed by: NURSE PRACTITIONER

## 2023-07-20 PROCEDURE — 87811 SARS-COV-2 COVID19 W/OPTIC: CPT | Mod: QW,S$GLB,, | Performed by: NURSE PRACTITIONER

## 2023-07-20 RX ORDER — ALBUTEROL SULFATE 0.83 MG/ML
2.5 SOLUTION RESPIRATORY (INHALATION)
Status: COMPLETED | OUTPATIENT
Start: 2023-07-20 | End: 2023-07-20

## 2023-07-20 RX ORDER — PREDNISONE 20 MG/1
40 TABLET ORAL DAILY
Qty: 10 TABLET | Refills: 0 | Status: SHIPPED | OUTPATIENT
Start: 2023-07-20 | End: 2023-07-25

## 2023-07-20 RX ORDER — ALBUTEROL SULFATE 90 UG/1
2 AEROSOL, METERED RESPIRATORY (INHALATION) EVERY 6 HOURS PRN
Qty: 18 G | Refills: 2 | Status: SHIPPED | OUTPATIENT
Start: 2023-07-20 | End: 2023-08-19

## 2023-07-20 RX ADMIN — ALBUTEROL SULFATE 2.5 MG: 0.83 SOLUTION RESPIRATORY (INHALATION) at 01:07

## 2023-07-20 NOTE — PATIENT INSTRUCTIONS
SOB (shortness of breath)  -     SARS Coronavirus 2 Antigen, POCT Manual Read    Moderate persistent asthma with exacerbation    -     albuterol nebulizer solution 2.5 mg - given in clinic    -     albuterol (VENTOLIN HFA) 90 mcg/actuation inhaler; Inhale 2 puffs into the lungs every 6 (six) hours as needed for Wheezing. Rescue  Dispense: 18 g; Refill: 2    -     predniSONE (DELTASONE) 20 MG tablet; Take 2 tablets (40 mg total) by mouth once daily. for 5 days  Dispense: 10 tablet; Refill: 0    -     Ambulatory referral/consult to Allergy  Referral ordered.  Call [phone] to schedule appt       - Rest at home.     - Drink plenty of fluids so you won't get dehydrated.    Take inhaler as prescribed and needed for wheezing     - you can take plain Mucinex (guaifenesin) 1200 mg twice a day to help loosen mucous.     - Fever/Pain recommendations:  Alternate Tylenol or Ibuprofen as directed as needed for fever/pain.    Take ibuprofen 600-800 mg every 6-8 hours for pain and inflammation.  Do not take ibuprofen if you have a history of GI bleeding, kidney disease, or if you take blood thinners.    You can also take Tylenol/acetaminophen 650-1000 mg every 6-8 hours for added pain relief. Avoid tylenol if you have a history of liver disease.     -Sore throat recommendations: Warm fluids, warm salt water gargles, throat lozenges, tea, honey, soup, or drinking something cold or frozen.  Throat lozenges or sprays help reduce pain. Gargling with warm saltwater (1/4 teaspoon of salt in 1/2 cup of warm water) or an OTC anesthetic gargle may be useful for irritation.    - Follow up with your PCP or specialty clinic as directed in the next 1-2 weeks if not improved or as needed.  You can call [phone] to schedule an appointment with the appropriate provider.      - If your condition worsens or fails to improve we recommend that you receive another evaluation at the ER immediately or contact your PCP to discuss your  concerns or return here.    When to seek medical advice  Call your healthcare provider right away if any of these occur:  Fever that is poorly controlled with OTC fever reducing medication  New or worsening ear pain, sinus pain, or headache  Stiff neck  You can't swallow liquids or you can't open your mouth wide because of throat pain  Signs of dehydration. These include very dark urine or no urine, sunken eyes, and dizziness.  Trouble breathing or noisy breathing  Muffled voice  Rash

## 2023-07-20 NOTE — PROGRESS NOTES
"Subjective:      Patient ID: Vadim Moyer is a 22 y.o. male.    Vitals:  height is 5' 8" (1.727 m) and weight is 72.6 kg (160 lb). His temperature is 98.4 °F (36.9 °C). His blood pressure is 123/86 and his pulse is 93. His respiration is 24 (abnormal) and oxygen saturation is 97%.     Chief Complaint: Shortness of Breath and Chest Tightness      Pt is a 23 yo male with HX asthma presenting with dyspnea, cough, chest tightness, sinus drainage.  Onset of symptoms was last night.  Pt states he ran out of the albuterol inhaler and needs a refill.     Shortness of Breath  This is a new problem. The current episode started in the past 7 days. The problem has been gradually worsening. Associated symptoms include orthopnea, rhinorrhea, sputum production and wheezing. Pertinent negatives include no chest pain, ear pain, fever, headaches, sore throat or vomiting. He has tried nothing for the symptoms. His past medical history is significant for allergies and asthma.     Constitution: Negative for chills, fatigue and fever.   HENT:  Positive for congestion and postnasal drip. Negative for ear pain, sinus pain and sore throat.    Cardiovascular:  Negative for chest pain.   Respiratory:  Positive for chest tightness, cough, sputum production, shortness of breath and wheezing.    Gastrointestinal:  Negative for nausea, vomiting and diarrhea.   Neurological:  Negative for headaches.    Objective:     Physical Exam   Constitutional: He is oriented to person, place, and time.   HENT:   Head: Normocephalic.   Ears:   Right Ear: Hearing and external ear normal. No no drainage, swelling or tenderness. Tympanic membrane is not erythematous, not retracted and not bulging. No middle ear effusion.   Left Ear: Hearing and external ear normal. No no drainage, swelling or tenderness. Tympanic membrane is not erythematous, not retracted and not bulging.  No middle ear effusion.   Nose: Nose normal. No mucosal edema, rhinorrhea or purulent " discharge. Right sinus exhibits no maxillary sinus tenderness and no frontal sinus tenderness. Left sinus exhibits no maxillary sinus tenderness and no frontal sinus tenderness.   Mouth/Throat: Uvula is midline, oropharynx is clear and moist and mucous membranes are normal. No uvula swelling. No oropharyngeal exudate, posterior oropharyngeal edema or posterior oropharyngeal erythema.   Eyes: EOM are normal.   Cardiovascular: Normal rate and regular rhythm.   Pulmonary/Chest: Effort normal. No accessory muscle usage or stridor. Tachypnea noted. No bradypnea. No respiratory distress. He has no decreased breath sounds. He has wheezes. He has rhonchi. He has no rales.   Musculoskeletal: Normal range of motion.         General: Normal range of motion.   Neurological: He is alert and oriented to person, place, and time.   Skin: Skin is warm and dry.   Nursing note and vitals reviewed.    Assessment:     1. Moderate persistent asthma with exacerbation    2. SOB (shortness of breath)      Based on patient's health history, presentation, physical exam, and in-clinic testing patient stable to discharge.  Symptoms improved with in-clinic albuterol treatment.  Advised on return/follow-up precautions. Advised on ER precautions. ED Precautions to go to Emergency Department if symptoms worsen or fail to improve.  Answered all patient questions. Patient verbalized understanding and agrees with plan of care.     Plan:       Patient Instructions     SOB (shortness of breath)  -     SARS Coronavirus 2 Antigen, POCT Manual Read    Moderate persistent asthma with exacerbation    -     albuterol nebulizer solution 2.5 mg - given in clinic    -     albuterol (VENTOLIN HFA) 90 mcg/actuation inhaler; Inhale 2 puffs into the lungs every 6 (six) hours as needed for Wheezing. Rescue  Dispense: 18 g; Refill: 2    -     predniSONE (DELTASONE) 20 MG tablet; Take 2 tablets (40 mg total) by mouth once daily. for 5 days  Dispense: 10 tablet; Refill:  0    -     Ambulatory referral/consult to Allergy  Referral ordered.  Call [phone] to schedule appt       - Rest at home.     - Drink plenty of fluids so you won't get dehydrated.    Take inhaler as prescribed and needed for wheezing     - you can take plain Mucinex (guaifenesin) 1200 mg twice a day to help loosen mucous.     - Fever/Pain recommendations:  Alternate Tylenol or Ibuprofen as directed as needed for fever/pain.    Take ibuprofen 600-800 mg every 6-8 hours for pain and inflammation.  Do not take ibuprofen if you have a history of GI bleeding, kidney disease, or if you take blood thinners.    You can also take Tylenol/acetaminophen 650-1000 mg every 6-8 hours for added pain relief. Avoid tylenol if you have a history of liver disease.     -Sore throat recommendations: Warm fluids, warm salt water gargles, throat lozenges, tea, honey, soup, or drinking something cold or frozen.  Throat lozenges or sprays help reduce pain. Gargling with warm saltwater (1/4 teaspoon of salt in 1/2 cup of warm water) or an OTC anesthetic gargle may be useful for irritation.    - Follow up with your PCP or specialty clinic as directed in the next 1-2 weeks if not improved or as needed.  You can call [phone] to schedule an appointment with the appropriate provider.      - If your condition worsens or fails to improve we recommend that you receive another evaluation at the ER immediately or contact your PCP to discuss your concerns or return here.    When to seek medical advice  Call your healthcare provider right away if any of these occur:  Fever that is poorly controlled with OTC fever reducing medication  New or worsening ear pain, sinus pain, or headache  Stiff neck  You can't swallow liquids or you can't open your mouth wide because of throat pain  Signs of dehydration. These include very dark urine or no urine, sunken eyes, and dizziness.  Trouble breathing or noisy breathing  Muffled voice  Rash

## 2023-07-27 PROCEDURE — 99285 EMERGENCY DEPT VISIT HI MDM: CPT | Mod: 25

## 2023-07-28 ENCOUNTER — HOSPITAL ENCOUNTER (EMERGENCY)
Facility: HOSPITAL | Age: 23
Discharge: HOME | End: 2023-07-28
Attending: EMERGENCY MEDICINE
Payer: MEDICAID

## 2023-07-28 VITALS
HEART RATE: 72 BPM | RESPIRATION RATE: 20 BRPM | WEIGHT: 150 LBS | OXYGEN SATURATION: 98 % | BODY MASS INDEX: 22.81 KG/M2 | TEMPERATURE: 99 F | DIASTOLIC BLOOD PRESSURE: 72 MMHG | SYSTOLIC BLOOD PRESSURE: 158 MMHG

## 2023-07-28 DIAGNOSIS — J45.901 EXACERBATION OF ASTHMA, UNSPECIFIED ASTHMA SEVERITY, UNSPECIFIED WHETHER PERSISTENT: Primary | ICD-10-CM

## 2023-07-28 LAB
ALBUMIN SERPL BCP-MCNC: 3.9 G/DL (ref 3.5–5.2)
ALP SERPL-CCNC: 106 U/L (ref 55–135)
ALT SERPL W/O P-5'-P-CCNC: 20 U/L (ref 10–44)
ANION GAP SERPL CALC-SCNC: 11 MMOL/L (ref 8–16)
AST SERPL-CCNC: 31 U/L (ref 10–40)
BASOPHILS # BLD AUTO: 0.04 K/UL (ref 0–0.2)
BASOPHILS NFR BLD: 0.3 % (ref 0–1.9)
BILIRUB SERPL-MCNC: 0.2 MG/DL (ref 0.1–1)
BUN SERPL-MCNC: 9 MG/DL (ref 6–20)
CALCIUM SERPL-MCNC: 8.9 MG/DL (ref 8.7–10.5)
CHLORIDE SERPL-SCNC: 106 MMOL/L (ref 95–110)
CO2 SERPL-SCNC: 22 MMOL/L (ref 23–29)
CREAT SERPL-MCNC: 0.8 MG/DL (ref 0.5–1.4)
CTP QC/QA: YES
CTP QC/QA: YES
DIFFERENTIAL METHOD: ABNORMAL
EOSINOPHIL # BLD AUTO: 0.1 K/UL (ref 0–0.5)
EOSINOPHIL NFR BLD: 0.6 % (ref 0–8)
ERYTHROCYTE [DISTWIDTH] IN BLOOD BY AUTOMATED COUNT: 12.8 % (ref 11.5–14.5)
EST. GFR  (NO RACE VARIABLE): >60 ML/MIN/1.73 M^2
GLUCOSE SERPL-MCNC: 93 MG/DL (ref 70–110)
HCT VFR BLD AUTO: 49.5 % (ref 40–54)
HGB BLD-MCNC: 16.4 G/DL (ref 14–18)
IMM GRANULOCYTES # BLD AUTO: 0.06 K/UL (ref 0–0.04)
IMM GRANULOCYTES NFR BLD AUTO: 0.4 % (ref 0–0.5)
LYMPHOCYTES # BLD AUTO: 1.1 K/UL (ref 1–4.8)
LYMPHOCYTES NFR BLD: 7.9 % (ref 18–48)
MCH RBC QN AUTO: 29.7 PG (ref 27–31)
MCHC RBC AUTO-ENTMCNC: 33.1 G/DL (ref 32–36)
MCV RBC AUTO: 90 FL (ref 82–98)
MONOCYTES # BLD AUTO: 0.6 K/UL (ref 0.3–1)
MONOCYTES NFR BLD: 4.2 % (ref 4–15)
NEUTROPHILS # BLD AUTO: 12.1 K/UL (ref 1.8–7.7)
NEUTROPHILS NFR BLD: 86.6 % (ref 38–73)
NRBC BLD-RTO: 0 /100 WBC
PLATELET # BLD AUTO: 213 K/UL (ref 150–450)
PMV BLD AUTO: 9.4 FL (ref 9.2–12.9)
POC MOLECULAR INFLUENZA A AGN: NEGATIVE
POC MOLECULAR INFLUENZA B AGN: NEGATIVE
POTASSIUM SERPL-SCNC: 3.8 MMOL/L (ref 3.5–5.1)
PROT SERPL-MCNC: 7 G/DL (ref 6–8.4)
RBC # BLD AUTO: 5.52 M/UL (ref 4.6–6.2)
SARS-COV-2 RDRP RESP QL NAA+PROBE: NEGATIVE
SODIUM SERPL-SCNC: 139 MMOL/L (ref 136–145)
WBC # BLD AUTO: 13.98 K/UL (ref 3.9–12.7)

## 2023-07-28 PROCEDURE — 94640 AIRWAY INHALATION TREATMENT: CPT

## 2023-07-28 PROCEDURE — 63600175 PHARM REV CODE 636 W HCPCS: Performed by: EMERGENCY MEDICINE

## 2023-07-28 PROCEDURE — 80053 COMPREHEN METABOLIC PANEL: CPT | Performed by: EMERGENCY MEDICINE

## 2023-07-28 PROCEDURE — 25000242 PHARM REV CODE 250 ALT 637 W/ HCPCS: Performed by: EMERGENCY MEDICINE

## 2023-07-28 PROCEDURE — 87635 SARS-COV-2 COVID-19 AMP PRB: CPT | Performed by: EMERGENCY MEDICINE

## 2023-07-28 PROCEDURE — 87502 INFLUENZA DNA AMP PROBE: CPT

## 2023-07-28 PROCEDURE — 25000003 PHARM REV CODE 250: Performed by: EMERGENCY MEDICINE

## 2023-07-28 PROCEDURE — 85025 COMPLETE CBC W/AUTO DIFF WBC: CPT | Performed by: EMERGENCY MEDICINE

## 2023-07-28 PROCEDURE — 96374 THER/PROPH/DIAG INJ IV PUSH: CPT

## 2023-07-28 RX ORDER — IPRATROPIUM BROMIDE AND ALBUTEROL SULFATE 2.5; .5 MG/3ML; MG/3ML
3 SOLUTION RESPIRATORY (INHALATION) EVERY 6 HOURS PRN
Qty: 30 EACH | Refills: 1 | Status: SHIPPED | OUTPATIENT
Start: 2023-07-28 | End: 2024-07-27

## 2023-07-28 RX ORDER — DEXAMETHASONE SODIUM PHOSPHATE 4 MG/ML
12 INJECTION, SOLUTION INTRA-ARTICULAR; INTRALESIONAL; INTRAMUSCULAR; INTRAVENOUS; SOFT TISSUE
Status: COMPLETED | OUTPATIENT
Start: 2023-07-28 | End: 2023-07-28

## 2023-07-28 RX ORDER — ALBUTEROL SULFATE 90 UG/1
1-2 AEROSOL, METERED RESPIRATORY (INHALATION) EVERY 6 HOURS PRN
Qty: 18 G | Refills: 1 | Status: SHIPPED | OUTPATIENT
Start: 2023-07-28

## 2023-07-28 RX ORDER — LISINOPRIL 10 MG/1
20 TABLET ORAL
Status: COMPLETED | OUTPATIENT
Start: 2023-07-28 | End: 2023-07-28

## 2023-07-28 RX ORDER — IPRATROPIUM BROMIDE AND ALBUTEROL SULFATE 2.5; .5 MG/3ML; MG/3ML
3 SOLUTION RESPIRATORY (INHALATION) EVERY 6 HOURS PRN
Qty: 30 EACH | Refills: 1 | Status: SHIPPED | OUTPATIENT
Start: 2023-07-28 | End: 2023-07-28 | Stop reason: SDUPTHER

## 2023-07-28 RX ORDER — PREDNISONE 50 MG/1
50 TABLET ORAL DAILY
Qty: 4 TABLET | Refills: 0 | Status: SHIPPED | OUTPATIENT
Start: 2023-07-28 | End: 2023-08-01

## 2023-07-28 RX ORDER — ALBUTEROL SULFATE 2.5 MG/.5ML
5 SOLUTION RESPIRATORY (INHALATION) ONCE
Status: COMPLETED | OUTPATIENT
Start: 2023-07-28 | End: 2023-07-28

## 2023-07-28 RX ORDER — IPRATROPIUM BROMIDE AND ALBUTEROL SULFATE 2.5; .5 MG/3ML; MG/3ML
3 SOLUTION RESPIRATORY (INHALATION)
Status: COMPLETED | OUTPATIENT
Start: 2023-07-28 | End: 2023-07-28

## 2023-07-28 RX ORDER — NEBULIZER AND COMPRESSOR
1 EACH MISCELLANEOUS
Qty: 1 EACH | Refills: 0 | Status: SHIPPED | OUTPATIENT
Start: 2023-07-28

## 2023-07-28 RX ADMIN — IPRATROPIUM BROMIDE AND ALBUTEROL SULFATE 3 ML: 2.5; .5 SOLUTION RESPIRATORY (INHALATION) at 01:07

## 2023-07-28 RX ADMIN — LISINOPRIL 20 MG: 10 TABLET ORAL at 02:07

## 2023-07-28 RX ADMIN — DEXAMETHASONE SODIUM PHOSPHATE 12 MG: 4 INJECTION, SOLUTION INTRA-ARTICULAR; INTRALESIONAL; INTRAMUSCULAR; INTRAVENOUS; SOFT TISSUE at 01:07

## 2023-07-28 RX ADMIN — ALBUTEROL SULFATE 5 MG: 2.5 SOLUTION RESPIRATORY (INHALATION) at 02:07

## 2023-07-28 NOTE — ED PROVIDER NOTES
Encounter Date: 2023       History     Chief Complaint   Patient presents with    Shortness of Breath     Patient reports increased asthma symptoms throughout the week.  States given prednisone and inhaler.  Unable to access inhaler today.  Cough present with yellow sputum.  Patient given albuterol 5 mg neb prior to arrival by EMS.       22-year-old male history hypertension, bipolar, asthma presents for shortness of breath wheezing and cough for about a week and a half.  Patient states he was unable to fill his albuterol prescription recently due to cost concerns.  Cough is productive with yellow sputum.    The history is provided by the patient.   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     Asthma, not well controlled     Asthma, well controlled     Atopy     Bipolar 1 disorder     Hypertension     Overweight(278.02)     Psychiatric symptoms      Past Surgical History:   Procedure Laterality Date    HAND SURGERY      PENIS SURGERY       Family History   Problem Relation Age of Onset    Asthma Mother     Asthma Sister     Hypertension Maternal Grandmother     Coronary artery disease Maternal Grandmother     Heart attack Maternal Grandfather      Social History     Tobacco Use    Smoking status: Former     Packs/day: 0.50     Types: Cigarettes     Start date:      Quit date:      Years since quittin.5     Passive exposure: Never    Smokeless tobacco: Never   Substance Use Topics    Alcohol use: No    Drug use: No     Review of Systems   Respiratory:  Positive for cough, chest tightness, shortness of breath and wheezing.      Physical Exam     Initial Vitals [23 2254]   BP Pulse Resp Temp SpO2   (!) 174/105 67 16 98.8 °F (37.1  °C) 99 %      MAP       --         Physical Exam    Nursing note and vitals reviewed.  Constitutional: He appears well-developed and well-nourished. No distress.   HENT:   Head: Normocephalic and atraumatic.   Mouth/Throat: Oropharynx is clear and moist.   Eyes: Conjunctivae and EOM are normal. Pupils are equal, round, and reactive to light.   Neck: Neck supple.   Normal range of motion.  Cardiovascular:  Normal rate, regular rhythm and intact distal pulses.           Distal pulses equal bilaterally   Pulmonary/Chest: No stridor. No respiratory distress. He has wheezes.   Musculoskeletal:         General: Normal range of motion.      Cervical back: Normal range of motion and neck supple.      Comments: Moving all extremities with grossly normal strength     Neurological: He is alert and oriented to person, place, and time.   CN 2-12 appear grossly intact   Skin: Skin is warm and dry.   Psychiatric: He has a normal mood and affect.       ED Course   Procedures  Labs Reviewed   CBC W/ AUTO DIFFERENTIAL - Abnormal; Notable for the following components:       Result Value    WBC 13.98 (*)     Gran # (ANC) 12.1 (*)     Immature Grans (Abs) 0.06 (*)     Gran % 86.6 (*)     Lymph % 7.9 (*)     All other components within normal limits   COMPREHENSIVE METABOLIC PANEL - Abnormal; Notable for the following components:    CO2 22 (*)     All other components within normal limits   SARS-COV-2 RDRP GENE   POCT INFLUENZA A/B MOLECULAR          Imaging Results              X-Ray Chest AP Portable (Final result)  Result time 07/28/23 01:02:28      Final result by Tomeka Bartholomew MD (07/28/23 01:02:28)                   Impression:      No acute intrathoracic abnormality identified on this single radiographic view of the chest.      Electronically signed by: Tomeka Bartholomew MD  Date:    07/28/2023  Time:    01:02               Narrative:    EXAMINATION:  XR CHEST AP PORTABLE    CLINICAL HISTORY:  dyspnea;    TECHNIQUE:  Single frontal  view of the chest was performed.    COMPARISON:  07/05/2023    FINDINGS:  The cardiomediastinal silhouette is within normal limits. Mediastinal structures are midline.  The lungs appear symmetrically expanded without definite evidence of confluent airspace consolidation, significant volume of pleural fluid or pneumothorax.  Visualized osseous structures are intact.                                    X-Rays:   Independently Interpreted Readings:   Chest X-Ray: Normal heart size.  No acute abnormalities.  No infiltrates.   Medications   albuterol-ipratropium 2.5 mg-0.5 mg/3 mL nebulizer solution 3 mL (3 mLs Nebulization Given 7/28/23 0124)   dexAMETHasone injection 12 mg (12 mg Intravenous Given 7/28/23 0101)   lisinopriL tablet 20 mg (20 mg Oral Given 7/28/23 0203)   albuterol sulfate nebulizer solution 5 mg (5 mg Nebulization Given 7/28/23 0209)     Medical Decision Making:   Initial Assessment:   Well-appearing nontoxic hypertensive otherwise normal vitals  Differential Diagnosis:   Asthma exacerbation, rule out COVID, flu, pneumonia           ED Course as of 07/28/23 0343   Fri Jul 28, 2023   0130 CBC shows mild leukocytosis likely stress response, no pneumonia on chest x-ray.  Chemistry profile normal.  COVID and flu negative. [AP]   0139 Patient's blood pressure persistently elevated.  States he used to take medicine for it but has been out for quite some time.  Prior medications include clonidine lisinopril.  Lisinopril ordered for this visit [AP]   0340 Wheezing significantly improved patient resting comfortably.  Prescriptions refilled.  Referred to Family Medicine [AP]      ED Course User Index  [AP] Raf Atkinson DO                 Clinical Impression:   Final diagnoses:  [J45.901] Exacerbation of asthma, unspecified asthma severity, unspecified whether persistent (Primary)        ED Disposition Condition    Discharge Stable          ED Prescriptions       Medication Sig Dispense Start Date End Date  Auth. Provider    albuterol (PROVENTIL/VENTOLIN HFA) 90 mcg/actuation inhaler Inhale 1-2 puffs into the lungs every 6 (six) hours as needed for Wheezing. Rescue 18 g 7/28/2023 -- Raf Atkisnon DO    nebulizer and compressor Penny 1 Device by Misc.(Non-Drug; Combo Route) route as needed. 1 each 7/28/2023 -- Raf Atkinson DO    albuterol-ipratropium (DUO-NEB) 2.5 mg-0.5 mg/3 mL nebulizer solution Take 3 mLs by nebulization every 6 (six) hours as needed for Wheezing. Rescue 30 each 7/28/2023 7/27/2024 Raf Atkinson DO    predniSONE (DELTASONE) 50 MG Tab Take 1 tablet (50 mg total) by mouth once daily. for 4 days 4 tablet 7/28/2023 8/1/2023 Raf Atkinson DO          Follow-up Information       Follow up With Specialties Details Why Contact Info Additional Information    [facility] Family Medicine Schedule an appointment as soon as possible for a visit   [address], [address]  [place] [address] [phone] Please park in [address] or [address] and use [address]. Take Medical Office Bldg. elevators.    [facility] Emergency Dept Emergency Medicine  If symptoms worsen [address] [address] [phone]              Raf Atkinson DO  07/28/23 9247

## 2023-07-28 NOTE — ED NOTES
Pt reports to ED with c/o SOB, cough, and wheezing that began about a week ago and has progressively gotten worse. Pt reports going to urgent care a few days ago and was prescribed prednisone. Pt reports no relief of symptoms. Pt has hx of asthma.     Adult Physical Assessment  LOC: Vadim Moyer, 22 y.o. male verified via two identifiers.  The patient is awake, alert, oriented and speaking appropriately at this time.  APPEARANCE: Patient resting comfortably and appears to be in no acute distress at this time. Patient is clean and well groomed, patient's clothing is properly fastened.  SKIN:The skin is warm and dry, color consistent with ethnicity, patient has normal skin turgor and moist mucus membranes, skin intact, no breakdown or brusing noted.  MUSCULOSKELETAL: Patient moving all extremities well, no obvious swelling or deformities noted.  RESPIRATORY: Airway is open and patent, respirations are spontaneous, patient has a normal effort and rate, no accessory muscle use noted. Inspiratory and expiratory wheezing noted to all lung fields. Pt has frequent productive cough.   CARDIAC: Patient has a normal rate and rhythm, no periphreal edema noted in any extremity, capillary refill < 3 seconds in all extremities. Pt hypertensive.   ABDOMEN: Soft and non tender to palpation, no abdominal distention noted. Bowel sounds present in all four quadrants.  NEUROLOGIC: Eyes open spontaneously, behavior appropriate to situation, follows commands, facial expression symmetrical, bilateral hand grasp equal and even, purposeful motor response noted, normal sensation in all extremities when touched with a finger.

## 2023-09-25 ENCOUNTER — HOSPITAL ENCOUNTER (EMERGENCY)
Facility: HOSPITAL | Age: 23
Discharge: HOME | End: 2023-09-25
Attending: EMERGENCY MEDICINE
Payer: MEDICAID

## 2023-09-25 VITALS
BODY MASS INDEX: 23.54 KG/M2 | TEMPERATURE: 99 F | HEART RATE: 70 BPM | WEIGHT: 150 LBS | OXYGEN SATURATION: 99 % | SYSTOLIC BLOOD PRESSURE: 144 MMHG | DIASTOLIC BLOOD PRESSURE: 70 MMHG | HEIGHT: 67 IN | RESPIRATION RATE: 18 BRPM

## 2023-09-25 DIAGNOSIS — F19.10 SUBSTANCE ABUSE: Primary | ICD-10-CM

## 2023-09-25 LAB
ALBUMIN SERPL BCP-MCNC: 3.9 G/DL (ref 3.5–5.2)
ALP SERPL-CCNC: 93 U/L (ref 55–135)
ALT SERPL W/O P-5'-P-CCNC: 7 U/L (ref 10–44)
AMPHET+METHAMPHET UR QL: NEGATIVE
ANION GAP SERPL CALC-SCNC: 10 MMOL/L (ref 8–16)
APAP SERPL-MCNC: <3 UG/ML (ref 10–20)
AST SERPL-CCNC: 15 U/L (ref 10–40)
BARBITURATES UR QL SCN>200 NG/ML: NEGATIVE
BASOPHILS # BLD AUTO: 0.03 K/UL (ref 0–0.2)
BASOPHILS NFR BLD: 0.6 % (ref 0–1.9)
BENZODIAZ UR QL SCN>200 NG/ML: NEGATIVE
BILIRUB SERPL-MCNC: 0.7 MG/DL (ref 0.1–1)
BILIRUB UR QL STRIP: NEGATIVE
BUN SERPL-MCNC: 14 MG/DL (ref 6–20)
BZE UR QL SCN: NEGATIVE
CALCIUM SERPL-MCNC: 9.4 MG/DL (ref 8.7–10.5)
CANNABINOIDS UR QL SCN: ABNORMAL
CHLORIDE SERPL-SCNC: 106 MMOL/L (ref 95–110)
CLARITY UR: CLEAR
CO2 SERPL-SCNC: 22 MMOL/L (ref 23–29)
COLOR UR: COLORLESS
CREAT SERPL-MCNC: 0.9 MG/DL (ref 0.5–1.4)
CREAT UR-MCNC: 77.5 MG/DL (ref 23–375)
DIFFERENTIAL METHOD: ABNORMAL
EOSINOPHIL # BLD AUTO: 0.1 K/UL (ref 0–0.5)
EOSINOPHIL NFR BLD: 2 % (ref 0–8)
ERYTHROCYTE [DISTWIDTH] IN BLOOD BY AUTOMATED COUNT: 13.2 % (ref 11.5–14.5)
EST. GFR  (NO RACE VARIABLE): >60 ML/MIN/1.73 M^2
ETHANOL SERPL-MCNC: <10 MG/DL
GLUCOSE SERPL-MCNC: 95 MG/DL (ref 70–110)
GLUCOSE UR QL STRIP: NEGATIVE
HCT VFR BLD AUTO: 51.1 % (ref 40–54)
HGB BLD-MCNC: 17.1 G/DL (ref 14–18)
HGB UR QL STRIP: NEGATIVE
IMM GRANULOCYTES # BLD AUTO: 0.03 K/UL (ref 0–0.04)
IMM GRANULOCYTES NFR BLD AUTO: 0.6 % (ref 0–0.5)
KETONES UR QL STRIP: NEGATIVE
LEUKOCYTE ESTERASE UR QL STRIP: NEGATIVE
LYMPHOCYTES # BLD AUTO: 1.5 K/UL (ref 1–4.8)
LYMPHOCYTES NFR BLD: 29.6 % (ref 18–48)
MCH RBC QN AUTO: 29.8 PG (ref 27–31)
MCHC RBC AUTO-ENTMCNC: 33.5 G/DL (ref 32–36)
MCV RBC AUTO: 89 FL (ref 82–98)
METHADONE UR QL SCN>300 NG/ML: NEGATIVE
MONOCYTES # BLD AUTO: 0.4 K/UL (ref 0.3–1)
MONOCYTES NFR BLD: 8.7 % (ref 4–15)
NEUTROPHILS # BLD AUTO: 2.9 K/UL (ref 1.8–7.7)
NEUTROPHILS NFR BLD: 58.5 % (ref 38–73)
NITRITE UR QL STRIP: NEGATIVE
NRBC BLD-RTO: 0 /100 WBC
OPIATES UR QL SCN: NEGATIVE
PCP UR QL SCN>25 NG/ML: NEGATIVE
PH UR STRIP: 6 [PH] (ref 5–8)
PLATELET # BLD AUTO: 214 K/UL (ref 150–450)
PMV BLD AUTO: 9.1 FL (ref 9.2–12.9)
POTASSIUM SERPL-SCNC: 4 MMOL/L (ref 3.5–5.1)
PROT SERPL-MCNC: 6.7 G/DL (ref 6–8.4)
PROT UR QL STRIP: ABNORMAL
RBC # BLD AUTO: 5.73 M/UL (ref 4.6–6.2)
SARS-COV-2 RDRP RESP QL NAA+PROBE: NEGATIVE
SODIUM SERPL-SCNC: 138 MMOL/L (ref 136–145)
SP GR UR STRIP: 1.01 (ref 1–1.03)
TOXICOLOGY INFORMATION: ABNORMAL
TSH SERPL DL<=0.005 MIU/L-ACNC: 0.64 UIU/ML (ref 0.4–4)
URN SPEC COLLECT METH UR: ABNORMAL
UROBILINOGEN UR STRIP-ACNC: NEGATIVE EU/DL
WBC # BLD AUTO: 5.03 K/UL (ref 3.9–12.7)

## 2023-09-25 PROCEDURE — 80053 COMPREHEN METABOLIC PANEL: CPT | Performed by: PHYSICIAN ASSISTANT

## 2023-09-25 PROCEDURE — 81003 URINALYSIS AUTO W/O SCOPE: CPT | Mod: 59 | Performed by: PHYSICIAN ASSISTANT

## 2023-09-25 PROCEDURE — U0002 COVID-19 LAB TEST NON-CDC: HCPCS | Performed by: PHYSICIAN ASSISTANT

## 2023-09-25 PROCEDURE — G0426 PR INPT TELEHEALTH CONSULT 50M: ICD-10-PCS | Mod: 95,AF,, | Performed by: PSYCHIATRY & NEUROLOGY

## 2023-09-25 PROCEDURE — 82077 ASSAY SPEC XCP UR&BREATH IA: CPT | Performed by: PHYSICIAN ASSISTANT

## 2023-09-25 PROCEDURE — 85025 COMPLETE CBC W/AUTO DIFF WBC: CPT | Performed by: PHYSICIAN ASSISTANT

## 2023-09-25 PROCEDURE — G0426 INPT/ED TELECONSULT50: HCPCS | Mod: 95,AF,, | Performed by: PSYCHIATRY & NEUROLOGY

## 2023-09-25 PROCEDURE — 80143 DRUG ASSAY ACETAMINOPHEN: CPT | Performed by: PHYSICIAN ASSISTANT

## 2023-09-25 PROCEDURE — 99285 EMERGENCY DEPT VISIT HI MDM: CPT

## 2023-09-25 PROCEDURE — 84443 ASSAY THYROID STIM HORMONE: CPT | Performed by: PHYSICIAN ASSISTANT

## 2023-09-25 PROCEDURE — 80307 DRUG TEST PRSMV CHEM ANLYZR: CPT | Performed by: PHYSICIAN ASSISTANT

## 2023-09-25 NOTE — DISCHARGE INSTRUCTIONS
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
## 2023-09-25 NOTE — ED PROVIDER NOTES
Encounter Date: 9/25/2023       History     Chief Complaint   Patient presents with    Suicidal     Pt arrived from home in police custody with [employer] and  EMS. Pt had an altercation at home and was placed under arrest. Pt then requested ems transport to ER due to suicidal thoughts without a plan. Pt arrived awake and alert in no distress.      22-year-old male presents to ED via EMS and [employer] with concern of suicidal ideation.  Per EMS report, patient was in altercation with his sister's boyfriend when police were contacted.  Police placed patient under custody but patient then began to state his concern for suicidal ideation, denying any specific plan at that time.  EMS transported patient to ED, but does state patient admits he is not currently suicidal and has had no previous suicidal thought.  Per chart review, patient does have history of bipolar and states he is on no current medications.  He again denies any suicidal ideation to me.  No HI/AVH.  He does admit to routine alcohol use but denies any other recent drug use.  Last alcoholic beverage yesterday.  No other acute complaints at this time.    The history is provided by the patient.     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     Asthma, not well controlled     Asthma, well controlled     Atopy     Bipolar 1 disorder     Hypertension     Overweight(278.02)     Psychiatric symptoms      Past Surgical History:   Procedure Laterality Date    HAND SURGERY      PENIS SURGERY       Family History   Problem Relation Age of Onset    Asthma Mother     Asthma Sister     Hypertension Maternal Grandmother     Coronary artery disease Maternal Grandmother     Heart attack Maternal Grandfather      Social  History     Tobacco Use    Smoking status: Former     Current packs/day: 0.00     Average packs/day: 0.5 packs/day for 9.0 years (4.5 ttl pk-yrs)     Types: Cigarettes     Start date:      Quit date:      Years since quittin.7     Passive exposure: Never    Smokeless tobacco: Never   Substance Use Topics    Alcohol use: Yes     Comment: daily    Drug use: No     Review of Systems   Respiratory:  Negative for cough and shortness of breath.    Cardiovascular:  Negative for chest pain.   Gastrointestinal:  Negative for abdominal pain, nausea and vomiting.   Musculoskeletal:  Negative for neck pain and neck stiffness.   Psychiatric/Behavioral:  Negative for hallucinations and self-injury.        Physical Exam     Initial Vitals [23 1128]   BP Pulse Resp Temp SpO2   (!) 158/83 83 20 99.7 °F (37.6 °C) 100 %      MAP       --         Physical Exam    Nursing note and vitals reviewed.  Constitutional: Vital signs are normal. He appears well-developed and well-nourished. He is cooperative. He does not have a sickly appearance. He does not appear ill. No distress.   No distress on my exam, cooperative   HENT:   Head: Normocephalic and atraumatic.   Eyes: EOM are normal.   Neck:   Normal range of motion.  Musculoskeletal:      Cervical back: Normal range of motion.     Neurological: He is alert and oriented to person, place, and time. GCS eye subscore is 4. GCS verbal subscore is 5. GCS motor subscore is 6.   Psychiatric:   Mildly anxious appearing.  Overall cooperative and not combative.  Denying current SI/HI/AVH         ED Course   Procedures  Labs Reviewed   CBC W/ AUTO DIFFERENTIAL - Abnormal; Notable for the following components:       Result Value    MPV 9.1 (*)     Immature Granulocytes 0.6 (*)     All other components within normal limits   COMPREHENSIVE METABOLIC PANEL - Abnormal; Notable for the following components:    CO2 22 (*)     ALT 7 (*)     All other components within normal limits    URINALYSIS, REFLEX TO URINE CULTURE - Abnormal; Notable for the following components:    Color, UA Colorless (*)     Protein, UA Trace (*)     All other components within normal limits    Narrative:     Specimen Source->Urine   DRUG SCREEN PANEL, URINE EMERGENCY - Abnormal; Notable for the following components:    THC Presumptive Positive (*)     All other components within normal limits    Narrative:     Specimen Source->Urine   ACETAMINOPHEN LEVEL - Abnormal; Notable for the following components:    Acetaminophen (Tylenol), Serum <3.0 (*)     All other components within normal limits   TSH   ALCOHOL,MEDICAL (ETHANOL)   SARS-COV-2 RNA AMPLIFICATION, QUAL          Imaging Results    None          Medications - No data to display  Medical Decision Making  With concern of suicidal ideation after stating suicidal thoughts after he was placed in custody by [place] police [employer] following altercation that occurred this morning.  Denying any SI/HI/AVH on arrival.  Patient in no distress on exam.    DDx:  Including but not limited to suicidal ideation, acute psychosis, bipolar, anxiety, stress    Amount and/or Complexity of Data Reviewed  Labs: ordered. Decision-making details documented in ED Course.               ED Course as of 09/25/23 1457   Mon Sep 25, 2023   1146 Patient has tested bedside, stating he is having no current suicidal ideations, no plan and denying any previous history of suicide ideations.  Patient was discussed with attending, Dr. Mccormick, who states to proceed with PEC at this time with labs and tele psych consult. [KS]   1306 CBC auto differential(!)  Unremarkable [KS]   1306 Comprehensive metabolic panel(!)  Unremarkable [KS]   1306 Acetaminophen level(!)  WNL [KS]   1306 Ethanol  WNL [KS]   1306 TSH  WNL [KS]   1333 Drug screen panel, emergency(!)  Positive THC [KS]   1333 Urinalysis, Reflex to Urine Culture Urine, Clean Catch(!)  Unremarkable [KS]   1438 Patient discussed with psych, Dr. Levine,  who has cleared patient for discharge to [facility] custody [KS]   1450 Will rescind PEC at this time.  Patient discharged to [facility] custody [KS]      ED Course User Index  [KS] Derrick Patel PA-C                    Clinical Impression:   Final diagnoses:  [F19.10] Substance abuse (Primary)        ED Disposition Condition    Discharge Stable          ED Prescriptions    None       Follow-up Information    None          Derrick Patel PA-C  09/25/23 1452
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
## 2023-10-02 ENCOUNTER — OFFICE VISIT (OUTPATIENT)
Dept: URGENT CARE | Facility: CLINIC | Age: 23
End: 2023-10-02
Payer: MEDICAID

## 2023-10-02 VITALS
HEIGHT: 67 IN | OXYGEN SATURATION: 99 % | WEIGHT: 149.94 LBS | DIASTOLIC BLOOD PRESSURE: 116 MMHG | HEART RATE: 84 BPM | BODY MASS INDEX: 23.53 KG/M2 | SYSTOLIC BLOOD PRESSURE: 154 MMHG | TEMPERATURE: 99 F | RESPIRATION RATE: 20 BRPM

## 2023-10-02 DIAGNOSIS — R03.0 ELEVATED BLOOD PRESSURE READING: ICD-10-CM

## 2023-10-02 DIAGNOSIS — H66.012 NON-RECURRENT ACUTE SUPPURATIVE OTITIS MEDIA OF LEFT EAR WITH SPONTANEOUS RUPTURE OF TYMPANIC MEMBRANE: Primary | ICD-10-CM

## 2023-10-02 PROCEDURE — 99213 PR OFFICE/OUTPT VISIT, EST, LEVL III, 20-29 MIN: ICD-10-PCS | Mod: S$GLB,,,

## 2023-10-02 PROCEDURE — 99213 OFFICE O/P EST LOW 20 MIN: CPT | Mod: S$GLB,,,

## 2023-10-02 RX ORDER — AMOXICILLIN AND CLAVULANATE POTASSIUM 875; 125 MG/1; MG/1
1 TABLET, FILM COATED ORAL EVERY 12 HOURS
Qty: 14 TABLET | Refills: 0 | Status: SHIPPED | OUTPATIENT
Start: 2023-10-02 | End: 2023-10-09

## 2023-10-02 NOTE — PATIENT INSTRUCTIONS
A referral for Primary care has been placed. Call [phone] to schedule an appointment. Make sure to follow up in 1-2 weeks or sooner if symptoms continue or worsen.     - Rest.    - Drink plenty of fluids.    - Acetaminophen (tylenol) or Ibuprofen (advil,motrin) as directed as needed for fever/pain. Avoid tylenol if you have a history of liver disease. Do not take ibuprofen if you have a history of GI bleeding, kidney disease, or if you take blood thinners.   - Ibuprofen dosing for adults: 400 mg by mouth every 4-6 hours as needed. Max: 2400 mg/day; Info: use lowest effective dose, shortest effective treatment duration; give w/ food if GI upset occurs.  - Ibuprofen dosing for children: [6 mo-10 yo] Dose: 5-10 mg/kg/dose by mouth every 6-8h as needed; Max: 40 mg/kg/day; Info: use lower dose for fever <102.5 F, higher dose for fever >102.5 F; use shortest effective tx duration; give w/ food if GI upset occurs. [11 yo and older] Dose: 200-400 mg by mouth every 4-6 hours as needed; Max: 1200 mg/day; Info: use lowest effective dose, shortest effective tx duration; give w/ food if GI upset occurs.  - Tylenol dosing for adults: [By mouth route, immediate-release form] Dose: 325-1000 mg by mouth every 4-6h as needed; Max: 1 g/4h and 4 g/day from all sources. [By mouth route, extended-release form] Dose: 650-1300 mg Extended Release by mouth every 8h as needed; Max: 4 g/day from all sources.   - Tylenol dosing for children: 6-10 yo [ oral tablet/capsule ] Dose: 1 tab/cap by mouth every 4-6h as needed; Max: 5 tabs or caps/24h; Info: do not exceed 75 mg/kg/day, up to 1 g/4h and 4 g/day, from all sources. 11 yo and older [ oral tablet/capsule ] Dose: 1-2 tabs/caps by mouth every 4-6h as needed; Max: 10 tabs or caps/24h; Info: do not exceed 1 g/4h and 4 g/day from all sources.    - You must understand that you have received an Urgent Care treatment only and that you may be released before all of your medical problems are  [Dear  ___] : Dear  [unfilled], known or treated.   - You, the patient, will arrange for follow up care as instructed.   - If your condition worsens or fails to improve we recommend that you receive another evaluation at the ER immediately or contact your PCP to discuss your concerns or return here.   - Follow up with your PCP or specialty clinic as directed in the next 1-2 weeks if not improved or as needed.  You can call [phone] to schedule an appointment with the appropriate provider.    If your symptoms do not improve or worsen, go to the emergency room immediately.     [Please see my note below.] : Please see my note below. [Courtesy Letter:] : I had the pleasure of seeing your patient, [unfilled], in my office today. [Consult Closing:] : Thank you very much for allowing me to participate in the care of this patient.  If you have any questions, please do not hesitate to contact me. [Sincerely,] : Sincerely, [DrDougie  ___] : Dr. LORENZ [FreeTextEntry3] : Joseline Thao M.D.

## 2023-10-02 NOTE — PROGRESS NOTES
"Subjective:      Patient ID: Vadim Moyer is a 22 y.o. male.    Vitals:  height is 5' 7" (1.702 m) and weight is 68 kg (149 lb 14.6 oz). His oral temperature is 98.8 °F (37.1 °C). His blood pressure is 154/116 (abnormal) and his pulse is 84. His respiration is 20 and oxygen saturation is 99%.     Chief Complaint: Otalgia    This is a 22 y.o. male who presents today with a chief complaint of left ear pain x 3 days. Patient complains of ringing in ear , patient put ear drops and cotton ball and had blood on cotton ball.     Otalgia   There is pain in the left ear. This is a new problem. The current episode started in the past 7 days. The problem occurs constantly. The problem has been unchanged. The pain is at a severity of 10/10. The pain is severe. Associated symptoms include ear discharge and headaches. Pertinent negatives include no coughing.       HENT:  Positive for ear pain, ear discharge and congestion.    Respiratory:  Negative for cough.    Neurological:  Positive for headaches. Negative for disorientation and altered mental status.   Psychiatric/Behavioral:  Negative for altered mental status and disorientation.       Objective:     Physical Exam   Constitutional: He is oriented to person, place, and time. He appears well-developed. He is cooperative.  Non-toxic appearance. He does not appear ill. No distress.      Comments:Patient sits comfortably in exam chair. Answers questions in complete sentences. Does not show any signs of distress or discoloration.        HENT:   Head: Normocephalic and atraumatic.   Ears:   Right Ear: Hearing, tympanic membrane, external ear and ear canal normal. impacted cerumen  Left Ear: Hearing, external ear and ear canal normal. There is drainage and tenderness. Tympanic membrane is injected and perforated. impacted cerumen  Nose: Nose normal. No mucosal edema, rhinorrhea, nasal deformity or congestion. No epistaxis. Right sinus exhibits no maxillary sinus tenderness and " no frontal sinus tenderness. Left sinus exhibits no maxillary sinus tenderness and no frontal sinus tenderness.   Mouth/Throat: Uvula is midline and mucous membranes are normal. No trismus in the jaw. Normal dentition. No uvula swelling. No oropharyngeal exudate, posterior oropharyngeal edema or posterior oropharyngeal erythema.   Eyes: Conjunctivae and lids are normal. No scleral icterus.   Neck: Trachea normal and phonation normal. Neck supple. No edema present. No erythema present. No neck rigidity present.   Cardiovascular: Normal rate, regular rhythm, normal heart sounds and normal pulses.   Pulmonary/Chest: Effort normal and breath sounds normal. No stridor. No respiratory distress. He has no decreased breath sounds. He has no wheezes. He has no rhonchi. He has no rales.   Abdominal: Normal appearance.   Musculoskeletal: Normal range of motion.         General: No deformity. Normal range of motion.   Neurological: He is alert and oriented to person, place, and time. He exhibits normal muscle tone. Coordination normal.   Skin: Skin is warm, dry, intact, not diaphoretic and not pale.   Psychiatric: His speech is normal and behavior is normal. Judgment and thought content normal.   Nursing note and vitals reviewed.      Assessment:     1. Non-recurrent acute suppurative otitis media of left ear with spontaneous rupture of tympanic membrane    2. Elevated blood pressure reading        Plan:       Non-recurrent acute suppurative otitis media of left ear with spontaneous rupture of tympanic membrane  -     amoxicillin-clavulanate 875-125mg (AUGMENTIN) 875-125 mg per tablet; Take 1 tablet by mouth every 12 (twelve) hours. for 7 days  Dispense: 14 tablet; Refill: 0    Elevated blood pressure reading  -     Ambulatory referral/consult to Family Practice                Patient Instructions   A referral for Primary care has been placed. Call [phone] to schedule an appointment. Make sure to follow up in 1-2 weeks or  sooner if symptoms continue or worsen.       - Rest.    - Drink plenty of fluids.    - Acetaminophen (tylenol) or Ibuprofen (advil,motrin) as directed as needed for fever/pain. Avoid tylenol if you have a history of liver disease. Do not take ibuprofen if you have a history of GI bleeding, kidney disease, or if you take blood thinners.   - Ibuprofen dosing for adults: 400 mg by mouth every 4-6 hours as needed. Max: 2400 mg/day; Info: use lowest effective dose, shortest effective treatment duration; give w/ food if GI upset occurs.  - Ibuprofen dosing for children: [6 mo-12 yo] Dose: 5-10 mg/kg/dose by mouth every 6-8h as needed; Max: 40 mg/kg/day; Info: use lower dose for fever <102.5 F, higher dose for fever >102.5 F; use shortest effective tx duration; give w/ food if GI upset occurs. [11 yo and older] Dose: 200-400 mg by mouth every 4-6 hours as needed; Max: 1200 mg/day; Info: use lowest effective dose, shortest effective tx duration; give w/ food if GI upset occurs.  - Tylenol dosing for adults: [By mouth route, immediate-release form] Dose: 325-1000 mg by mouth every 4-6h as needed; Max: 1 g/4h and 4 g/day from all sources. [By mouth route, extended-release form] Dose: 650-1300 mg Extended Release by mouth every 8h as needed; Max: 4 g/day from all sources.   - Tylenol dosing for children: 6-12 yo [ oral tablet/capsule ] Dose: 1 tab/cap by mouth every 4-6h as needed; Max: 5 tabs or caps/24h; Info: do not exceed 75 mg/kg/day, up to 1 g/4h and 4 g/day, from all sources. 11 yo and older [ oral tablet/capsule ] Dose: 1-2 tabs/caps by mouth every 4-6h as needed; Max: 10 tabs or caps/24h; Info: do not exceed 1 g/4h and 4 g/day from all sources.    - You must understand that you have received an Urgent Care treatment only and that you may be released before all of your medical problems are known or treated.   - You, the patient, will arrange for follow up care as instructed.   - If your condition worsens or fails to  improve we recommend that you receive another evaluation at the ER immediately or contact your PCP to discuss your concerns or return here.   - Follow up with your PCP or specialty clinic as directed in the next 1-2 weeks if not improved or as needed.  You can call [phone] to schedule an appointment with the appropriate provider.    If your symptoms do not improve or worsen, go to the emergency room immediately.

## 2023-10-02 NOTE — LETTER
October 2, 2023      [facility]  [address] [address]  Phone: [phone]  Fax: [phone]       Patient: Vadim Moyer   YOB: 2000  Date of Visit: 10/02/2023    To Whom It May Concern:    Frankie Moyer  was at [facility] on 10/02/2023. The patient may return to work/school on 10/03/23 with no restrictions. If you have any questions or concerns, or if I can be of further assistance, please do not hesitate to contact me.    Sincerely,    Lynnette Campbell PA-C